# Patient Record
Sex: FEMALE | Race: WHITE | NOT HISPANIC OR LATINO | Employment: FULL TIME | URBAN - METROPOLITAN AREA
[De-identification: names, ages, dates, MRNs, and addresses within clinical notes are randomized per-mention and may not be internally consistent; named-entity substitution may affect disease eponyms.]

---

## 2017-01-02 ENCOUNTER — OFFICE VISIT (OUTPATIENT)
Dept: FAMILY MEDICINE | Facility: CLINIC | Age: 22
End: 2017-01-02
Payer: COMMERCIAL

## 2017-01-02 VITALS
SYSTOLIC BLOOD PRESSURE: 128 MMHG | HEIGHT: 72 IN | BODY MASS INDEX: 39.68 KG/M2 | WEIGHT: 293 LBS | HEART RATE: 130 BPM | OXYGEN SATURATION: 97 % | TEMPERATURE: 97 F | DIASTOLIC BLOOD PRESSURE: 78 MMHG | RESPIRATION RATE: 18 BRPM

## 2017-01-02 DIAGNOSIS — Z01.419 ENCOUNTER FOR GYNECOLOGICAL EXAMINATION WITHOUT ABNORMAL FINDING: ICD-10-CM

## 2017-01-02 DIAGNOSIS — Z30.432 ENCOUNTER FOR IUD REMOVAL: Primary | ICD-10-CM

## 2017-01-02 PROBLEM — E66.01 MORBID OBESITY DUE TO EXCESS CALORIES (H): Status: ACTIVE | Noted: 2017-01-02

## 2017-01-02 PROCEDURE — 58301 REMOVE INTRAUTERINE DEVICE: CPT | Performed by: FAMILY MEDICINE

## 2017-01-02 PROCEDURE — 87491 CHLMYD TRACH DNA AMP PROBE: CPT | Mod: 90 | Performed by: FAMILY MEDICINE

## 2017-01-02 PROCEDURE — 99203 OFFICE O/P NEW LOW 30 MIN: CPT | Mod: 25 | Performed by: FAMILY MEDICINE

## 2017-01-02 PROCEDURE — 87591 N.GONORRHOEAE DNA AMP PROB: CPT | Mod: 90 | Performed by: FAMILY MEDICINE

## 2017-01-02 PROCEDURE — 99000 SPECIMEN HANDLING OFFICE-LAB: CPT | Performed by: FAMILY MEDICINE

## 2017-01-02 PROCEDURE — G0145 SCR C/V CYTO,THINLAYER,RESCR: HCPCS | Mod: 90 | Performed by: FAMILY MEDICINE

## 2017-01-02 ASSESSMENT — PAIN SCALES - GENERAL: PAINLEVEL: NO PAIN (0)

## 2017-01-02 NOTE — PROGRESS NOTES
SUBJECTIVE:                                                    Linette Rosa is a 21 year old female who presents to clinic today for the following health issues:      Concern - IUD Removal   Linette is here today for Mirena removal. She is new to our clinic. She had it placed in couple years ago and it has been working well. She was using it mainly for both control management. She and her significant other are trying to conceive and therefore she would like to have it removed today. She has never been pregnant before. She is otherwise healthy. She was smoking a half a pack per day but is quitting. No alcohol. Her last Pap smear was years ago and was normal; no history of abnormal Pap smear. Denies of STI risks and no history of STI infection. No other concern.    Problem list and histories reviewed & adjusted, as indicated.  Additional history: as documented    There is no problem list on file for this patient.    Past Surgical History   Procedure Laterality Date     Wrist surgery         Social History   Substance Use Topics     Smoking status: Current Every Day Smoker -- 0.50 packs/day for 9 years     Types: Cigarettes     Smokeless tobacco: Never Used      Comment: started age 12, officially  when she was 16     Alcohol Use: 0.0 oz/week     0 Standard drinks or equivalent per week      Comment: occasionally     Family History   Problem Relation Age of Onset     DIABETES Mother      Unknown/Adopted Maternal Grandmother      Unknown/Adopted Maternal Grandfather      Unknown/Adopted Paternal Grandmother      Unknown/Adopted Paternal Grandfather          No current outpatient prescriptions on file.     Allergies   Allergen Reactions     [place] Oil [Fish Oil] Swelling       ROS:  Constitutional, HEENT, cardiovascular, pulmonary, gi and gu systems are negative, except as otherwise noted.    OBJECTIVE:                                                    /78 mmHg  Pulse 130  Temp(Src) 97  F (36.1  C) (Temporal)  " Resp 18  Ht 5' 11.75\" (1.822 m)  Wt 339 lb 1.6 oz (153.815 kg)  BMI 46.33 kg/m2  SpO2 97%  Breastfeeding? No  Body mass index is 46.33 kg/(m^2).  GENERAL: healthy, alert and no distress  CV: regular rate and rhythm.  ABDOMEN: soft, nontender and bowel sounds normal   (female): normal female external genitalia, normal urethral meatus, vaginal mucosa, normal cervix/adnexa/uterus without masses or discharge.  Obvious IUD string was noted. Pending for Pap and G/C.  No external lesion was noted.    Diagnostic Test Results:  No results found for this or any previous visit.     ASSESSMENT/PLAN:                                                    1. Encounter for IUD removal    Linette is here today for her Mirena removal. She had a put in about couple years ago through [facility]. It has been working well with no side effect. She and her significant other is planning to conceive.  The Mirena was removed without problem today and please see my procedure note for further details.  Informed her that once the IUd is removed, she had no birth control backup and therefore she could get pregnant at any time. She stated she would be very happy to get pregnant right away. Recommend to start prenatal vitamin daily.    - REMOVE INTRAUTERINE DEVICE    2. Encounter for routine gynecological examination  She believes that she needed a Pap smear. She can't recall when her last Pap smear was. No history of abnormal Pap smear. Denies any risks for STI and had no history of it. Pap smear obtained. Also pending for GC. STD prevention discussed..    - Pap imaged thin layer screen only - recommended age 21 - 24 years  - NEISSERIA GONORRHOEA PCR  - CHLAMYDIA TRACHOMATIS PCR    Mart Aranda Mai, MD  [facility]    "

## 2017-01-02 NOTE — Clinical Note
[address] [address]  [phone]      January 4, 2017    Linette Rosa  [address] [facility] [address]          Dear Linette,    I am happy to inform you that your recent cervical cancer screening test (PAP smear) was normal.      Preventative screening such as this helps insure your health for years to come.  This test should be repeated in 3 years unless otherwise directed.    You will still need to return to the clinic every year for your annual exam and other preventive tests.    Please contact the clinic if you have further questions.      Sincerely,    Mart Aranda Mai, MD/jose

## 2017-01-03 LAB
C TRACH DNA SPEC QL NAA+PROBE: NORMAL
N GONORRHOEA DNA SPEC QL NAA+PROBE: NORMAL
SPECIMEN SOURCE: NORMAL
SPECIMEN SOURCE: NORMAL

## 2017-01-04 LAB
COPATH REPORT: NORMAL
PAP: NORMAL

## 2017-01-05 ENCOUNTER — TELEPHONE (OUTPATIENT)
Dept: FAMILY MEDICINE | Facility: CLINIC | Age: 22
End: 2017-01-05

## 2017-01-05 NOTE — TELEPHONE ENCOUNTER
Notes Recorded by Mart Bronson MD on 1/4/2017 at 7:45 AM  Please let patient know that both of her chlamydia and gonorrhea test was negative as expected. Thank you

## 2017-01-05 NOTE — Clinical Note
[address] [address]  Phone: [phone]  Fax: [phone]          January 5, 2017    Linette Rosa  [address] [address] [place] [address]          Dear Linette,      LAB RESULTS:     The results of your recent GC Chlamydia were NORMAL.  If you have any further questions or problems, please contact our office.          Sincerely,      Mart Bronson M.D.

## 2017-01-05 NOTE — TELEPHONE ENCOUNTER
Tried to reach patient, left message for patient to call the clinic back.  Normal letter sent.  Angelia Porter CMA

## 2017-06-12 ENCOUNTER — HOSPITAL ENCOUNTER (EMERGENCY)
Facility: CLINIC | Age: 22
Discharge: HOME | End: 2017-06-12
Attending: FAMILY MEDICINE | Admitting: FAMILY MEDICINE
Payer: COMMERCIAL

## 2017-06-12 VITALS
RESPIRATION RATE: 16 BRPM | DIASTOLIC BLOOD PRESSURE: 100 MMHG | TEMPERATURE: 98.3 F | HEART RATE: 100 BPM | WEIGHT: 293 LBS | BODY MASS INDEX: 39.68 KG/M2 | OXYGEN SATURATION: 97 % | HEIGHT: 72 IN | SYSTOLIC BLOOD PRESSURE: 143 MMHG

## 2017-06-12 DIAGNOSIS — H66.002 LEFT ACUTE SUPPURATIVE OTITIS MEDIA: ICD-10-CM

## 2017-06-12 PROCEDURE — 99284 EMERGENCY DEPT VISIT MOD MDM: CPT | Mod: Z6 | Performed by: FAMILY MEDICINE

## 2017-06-12 PROCEDURE — 99282 EMERGENCY DEPT VISIT SF MDM: CPT | Performed by: FAMILY MEDICINE

## 2017-06-12 RX ORDER — TETRACAINE HYDROCHLORIDE 5 MG/ML
3 SOLUTION OPHTHALMIC EVERY 4 HOURS PRN
Qty: 1 ML | Refills: 0 | Status: SHIPPED | OUTPATIENT
Start: 2017-06-12 | End: 2017-11-03

## 2017-06-12 RX ORDER — TETRACAINE HYDROCHLORIDE 5 MG/ML
3 SOLUTION OPHTHALMIC EVERY 4 HOURS PRN
Qty: 1 ML | Refills: 0 | Status: SHIPPED | OUTPATIENT
Start: 2017-06-12 | End: 2017-06-12

## 2017-06-12 RX ORDER — AMOXICILLIN 500 MG/1
500 CAPSULE ORAL 3 TIMES DAILY
Qty: 30 CAPSULE | Refills: 0 | Status: SHIPPED | OUTPATIENT
Start: 2017-06-12 | End: 2017-06-22

## 2017-06-12 NOTE — ED AVS SNAPSHOT
[facility]    [address] [place] [address]    Phone:  [phone]    Fax:  [phone]                                       Linette Rosa   MRN: [id number]    Department:  [facility]   Date of Visit:  6/12/2017           After Visit Summary Signature Page     I have received my discharge instructions, and my questions have been answered. I have discussed any challenges I see with this plan with the nurse or doctor.    ..........................................................................................................................................  Patient/Patient Representative Signature      ..........................................................................................................................................  Patient Representative Print Name and Relationship to Patient    ..................................................               ................................................  Date                                            Time    ..........................................................................................................................................  Reviewed by Signature/Title    ...................................................              ..............................................  Date                                                            Time

## 2017-06-12 NOTE — ED AVS SNAPSHOT
[facility] Department    [address] [place] [address]    Phone:  [phone]    Fax:  [phone]                                       Linette Rosa   MRN: [id number]    Department:  [facility] Emergency Department   Date of Visit:  6/12/2017           Patient Information     Date Of Birth          1995        Your diagnoses for this visit were:     Left acute suppurative otitis media        You were seen by Manjit Reinoso MD.      Follow-up Information     Follow up with [facility] Emergency Department.    Specialty:  EMERGENCY MEDICINE    Contact information:    [facility]   [place] [id number] [phone]    Additional information:    From [address]: Exit at [address] on south side of [place]. Turn right on [address]. Turn left at stoplight on [address]. [facility] will be in view two blocks ahead        Follow up with your primary care provider In 3 days.    Why:  if not improving        Discharge Instructions       Begin amoxicillin 500 mg 3 times a day for 10 days.    Use  tetracaine drops in left ear as needed for pain        Otitis Media (Middle-Ear Infection) in Adults  Otitis media is another name for a middle-ear infection. It means an infection behind your eardrum. This kind of ear infection can happen after any condition that keeps fluid from draining from the middle ear. These conditions include allergies, a cold, a sore throat, or a respiratory infection.  Middle-ear infections are common in children, but they can also happen in adults. An ear infection in an adult may mean a more serious problem than in a child. So you may need additional tests. If you have an ear infection, you should see your health care provider for treatment.  What are the types of middle-ear infections?  Infections can affect the middle ear in several ways. They are:    Acute otitis media. This middle-ear infection occurs  suddenly. It causes swelling and redness. Fluid and mucus become trapped inside the ear. You can have a fever and ear pain.    Otitis media with effusion. Fluid (effusion) and mucus build up in the middle ear after the infection goes away. You may feel like your middle ear is full. This can continue for months and may affect your hearing.    Chronic otitis media with effusion. Fluid (effusion) remains in the middle ear for a long time. Or it builds up again and again, even though there is no infection. This type of middle-ear infection may be hard to treat. It may also affect your hearing.  Who is more likely to get a middle-ear infection?  You are more likely to get an ear infection if you:    Smoke or are around someone who smokes    Have seasonal or year-round allergy symptoms    Have a cold or other upper respiratory infection  What causes a middle-ear infection?  The middle ear connects to the throat by a canal called the eustachian tube. This tube helps even out the pressure between the outer ear and the inner ear. A cold or allergy can irritate the tube or cause the area around it to swell. This can keep fluid from draining from the middle ear. The fluid builds up behind the eardrum. Bacteria and viruses can grow in this fluid. The bacteria and viruses cause the middle-ear infection.  What are the symptoms of a middle-ear infection?  Common symptoms of a middle-ear infection in adults are:    Pain in 1 or both ears    Drainage from the ear    Muffled hearing    Sore throat   You may also have a fever. Rarely, your balance can be affected.  These symptoms may be the same as for other conditions. It s important to talk with your health care provider if you think you have a middle-ear infection. If you have a high fever, severe pain behind your ear, or paralysis in your face, see your provider as soon as you can.  How is a middle-ear infection diagnosed?  Your health care provider will take a medical history and  do a physical exam. He or she will look at the outer ear and eardrum with an otoscope. The otoscope is a lighted tool that lets your provider see inside the ear. A pneumatic otoscope blows a puff of air into the ear to check how well your eardrum moves. If you eardrum doesn t move well, it may mean you have fluid behind it.  Your provider may also do a test called tympanometry. This test tells how well the middle ear is working. It can find any changes in pressure in the middle ear. Your provider may test your hearing with a tuning fork.  How is a middle-ear infection treated?  A middle-ear infection may be treated with:    Antibiotics, taken by mouth or as ear drops    Medication for pain    Decongestants, antihistamines, or nasal steroids  Your health care provider may also have you try autoinsufflation. This helps adjust the air pressure in your ear. For this, you pinch your nose and gently exhale. This forces air back through the eustachian tube.  The exact treatment for your ear infection will depend on the type of infection you have. In general, if your symptoms don t get better in 48 to 72 hours, contact your health care provider.  Middle-ear infections can cause long-term problems if not treated. They can lead to:    Infection in other parts of the head    Permanent hearing loss    Paralysis of a nerve in your face  If you have a middle-ear infection that doesn t get better, you may need to see an ear, nose, and throat specialist (otolaryngologist). You may need a CT scan or MRI to check for head and neck cancer.  Ear tubes  Sometimes fluid stays in the middle ear even after you take antibiotics and the infection goes away. In this case, your health care provider may suggest that a small tube be placed in your ear. The tube is put at the opening of the eardrum. The tube keeps fluid from building up and relieves pressure in the middle ear. It can also help you hear better. This surgery is called myringotomy.  It is not often done in adults.  The tubes usually fall out on their own after 6 months to a year.    7488-5559 [employer]. [address] [place] [address], [place] [address]. All rights reserved. This information is not intended as a substitute for professional medical care. Always follow your healthcare professional's instructions.          24 Hour Appointment Hotline       To make an appointment at any [facility], call [phone] ([phone]). If you don't have a family doctor or clinic, we will help you find one. [facility] clinics are conveniently located to serve the needs of you and your family.             Review of your medicines      START taking        Dose / Directions Last dose taken    amoxicillin 500 MG capsule   Commonly known as:  AMOXIL   Dose:  500 mg   Quantity:  30 capsule        Take 1 capsule (500 mg) by mouth 3 times daily for 10 days   Refills:  0        tetracaine 0.5 % ophthalmic solution   Commonly known as:  PONTOCAINE   Dose:  3 drop   Quantity:  1 mL        Apply 3 drops to eye every 4 hours as needed Instill drops in LEFT EAR INSTEAD OF EYE as needed for ear pain for 2 days   Refills:  0          Our records show that you are taking the medicines listed below. If these are incorrect, please call your family doctor or clinic.        Dose / Directions Last dose taken    IBUPROFEN PO   Dose:  800 mg        Take 800 mg by mouth   Refills:  0        TYLENOL PO   Dose:  1000 mg        Take 1,000 mg by mouth   Refills:  0                Prescriptions were sent or printed at these locations (2 Prescriptions)                   [facility] [place] [id number]   [address], [facility] [address]    Telephone:  [phone]   Fax:  [phone]   Hours:                  E-Prescribed (2 of 2)         amoxicillin (AMOXIL) 500 MG capsule               tetracaine (PONTOCAINE) 0.5 % ophthalmic solution                Orders Needing Specimen Collection  "    None      Pending Results     No orders found from 6/10/2017 to 2017.            Pending Culture Results     No orders found from 6/10/2017 to 2017.            Pending Results Instructions     If you had any lab results that were not finalized at the time of your Discharge, you can call the ED Lab Result RN at [phone]. You will be contacted by this team for any positive Lab results or changes in treatment. The nurses are available 7 days a week from 10A to 6:30P.  You can leave a message 24 hours per day and they will return your call.        Thank you for choosing [facility]       Thank you for choosing [facility] for your care. Our goal is always to provide you with excellent care. Hearing back from our patients is one way we can continue to improve our services. Please take a few minutes to complete the written survey that you may receive in the mail after you visit with us. Thank you!        [employer] Information     [employer] lets you send messages to your doctor, view your test results, renew your prescriptions, schedule appointments and more. To sign up, go to [email or web address] . Click on \"Log in\" on the left side of the screen, which will take you to the Welcome page. Then click on \"Sign up Now\" on the right side of the page.     You will be asked to enter the access code listed below, as well as some personal information. Please follow the directions to create your username and password.     Your access code is: [email or web address]  Expires: 9/10/2017  8:19 PM     Your access code will  in 90 days. If you need help or a new code, please call your [facility] clinic or [phone].        Care EveryWhere ID     This is your Care EveryWhere ID. This could be used by other organizations to access your [facility] medical records  [id number]        After Visit Summary       This is your record. Keep this with you and show to your community pharmacist(s) and doctor(s) at your next visit.             "

## 2017-06-13 NOTE — ED PROVIDER NOTES
[facility] ED Provider Note   CC:     Chief Complaint   Patient presents with     Otalgia     Left ear pain and drainage. Started two days ago.      HPI:  Linette Rosa is a 21 year old female who presented to the emergency department with complaints of a left ear infection. She states that it began approximately three days ago, but the pain began worsening yesterday. At this time, she rates her pain at a 9 out of 10 on the pain scale. She reports that it was draining fluid last night and this morning. The patient endorses having a headache at this time. She denies swimming, cold, fever, cough, rhinorrhea, current medications, and chronic health conditions.     Problem List:  Patient Active Problem List    Diagnosis     Morbid obesity due to excess calories (H)       MEDS:   Discharge Medication List as of 6/12/2017  8:19 PM      CONTINUE these medications which have NOT CHANGED    Details   Acetaminophen (TYLENOL PO) Take 1,000 mg by mouth, Historical      IBUPROFEN PO Take 800 mg by mouth, Historical             ALLERGIES:    Allergies   Allergen Reactions     [place] Oil [Fish Oil] Swelling       Past medical, surgical, family and social histories, triage and nursing notes were all reviewed.    Review of Systems   All other systems were reviewed and are negative    Physical Exam   Vitals were reviewed  Temp: 98.3  F (36.8  C) Temp src: Temporal BP: (!) 143/100 Pulse: 100   Resp: 16 SpO2: 97 %      GENERAL APPEARANCE: Alert, moderate distress due to pain  FACE: normal facies  EYES: Pupils are equal  HENT: normal external exam; left TM is grossly abnormal landmarks with bulging and mild to moderate purulence of the tympanic membrane.  No obvious perforation  NECK: no adenopathy or asymmetry  RESP: normal respiratory effort; clear breath sounds bilaterally  CV: regular rate and rhythm; no significant murmurs, gallops or rubs  ABD: soft, no  tenderness; no rebound or guarding; bowel sounds are normal  EXT: No calf tenderness or pitting edema  SKIN: no worrisome rash          Available Lab/Imaging Results   No results found for this or any previous visit (from the past 24 hour(s)).      Impression     Final diagnoses:   Left acute suppurative otitis media       ED Course & Medical Decision Making   Linette Rosa is a 21 year old female who presented to the emergency department with 3 day history of left ear pain, with some drainage from the left ear and severe pain today of 9/10.  Patient denies any swimming exposure, and does not have any current URI symptoms.  She does not have fever.  Hearing is muffled.  Exam reveals a suppurative left otitis media.  Patient has not been on antibiotics in the last 3 months.  Begin amoxicillin 500 mg 3 times a day for 10 days.  Use tetracaine numbing drops as needed for pain.  Recheck in the clinic in 3 days if not improving.  If she has significant drainage, I recommended a recheck in the clinic in 10 days check for the possibility of a ruptured TM.  Return to the ED at any time if symptoms worsen.      Written after-visit summary and instructions were given at the time of discharge.      Discharge Medication List as of 6/12/2017  8:19 PM      START taking these medications    Details   amoxicillin (AMOXIL) 500 MG capsule Take 1 capsule (500 mg) by mouth 3 times daily for 10 days, Disp-30 capsule, R-0, E-Prescribe      tetracaine (PONTOCAINE) 0.5 % ophthalmic solution Apply 3 drops to eye every 4 hours as needed Instill drops in LEFT EAR INSTEAD OF EYE as needed for ear pain for 2 days, Disp-1 mL, R-0, E-Prescribe           This document serves as a record of services personally performed by Manjit Reinoso MD. It was created on their behalf by Helen Alvarenga, a trained medical scribe. The creation of this record is based on the provider's personal observations and the statements of the patient. This document has  been checked and approved by the attending provider.    This note was completed in part using Dragon voice recognition, and may contain word and grammatical errors.        Manjit Reinoso MD  06/12/17 9039

## 2017-06-13 NOTE — DISCHARGE INSTRUCTIONS
Begin amoxicillin 500 mg 3 times a day for 10 days.    Use  tetracaine drops in left ear as needed for pain        Otitis Media (Middle-Ear Infection) in Adults  Otitis media is another name for a middle-ear infection. It means an infection behind your eardrum. This kind of ear infection can happen after any condition that keeps fluid from draining from the middle ear. These conditions include allergies, a cold, a sore throat, or a respiratory infection.  Middle-ear infections are common in children, but they can also happen in adults. An ear infection in an adult may mean a more serious problem than in a child. So you may need additional tests. If you have an ear infection, you should see your health care provider for treatment.  What are the types of middle-ear infections?  Infections can affect the middle ear in several ways. They are:    Acute otitis media. This middle-ear infection occurs suddenly. It causes swelling and redness. Fluid and mucus become trapped inside the ear. You can have a fever and ear pain.    Otitis media with effusion. Fluid (effusion) and mucus build up in the middle ear after the infection goes away. You may feel like your middle ear is full. This can continue for months and may affect your hearing.    Chronic otitis media with effusion. Fluid (effusion) remains in the middle ear for a long time. Or it builds up again and again, even though there is no infection. This type of middle-ear infection may be hard to treat. It may also affect your hearing.  Who is more likely to get a middle-ear infection?  You are more likely to get an ear infection if you:    Smoke or are around someone who smokes    Have seasonal or year-round allergy symptoms    Have a cold or other upper respiratory infection  What causes a middle-ear infection?  The middle ear connects to the throat by a canal called the eustachian tube. This tube helps even out the pressure between the outer ear and the inner ear. A  cold or allergy can irritate the tube or cause the area around it to swell. This can keep fluid from draining from the middle ear. The fluid builds up behind the eardrum. Bacteria and viruses can grow in this fluid. The bacteria and viruses cause the middle-ear infection.  What are the symptoms of a middle-ear infection?  Common symptoms of a middle-ear infection in adults are:    Pain in 1 or both ears    Drainage from the ear    Muffled hearing    Sore throat   You may also have a fever. Rarely, your balance can be affected.  These symptoms may be the same as for other conditions. It s important to talk with your health care provider if you think you have a middle-ear infection. If you have a high fever, severe pain behind your ear, or paralysis in your face, see your provider as soon as you can.  How is a middle-ear infection diagnosed?  Your health care provider will take a medical history and do a physical exam. He or she will look at the outer ear and eardrum with an otoscope. The otoscope is a lighted tool that lets your provider see inside the ear. A pneumatic otoscope blows a puff of air into the ear to check how well your eardrum moves. If you eardrum doesn t move well, it may mean you have fluid behind it.  Your provider may also do a test called tympanometry. This test tells how well the middle ear is working. It can find any changes in pressure in the middle ear. Your provider may test your hearing with a tuning fork.  How is a middle-ear infection treated?  A middle-ear infection may be treated with:    Antibiotics, taken by mouth or as ear drops    Medication for pain    Decongestants, antihistamines, or nasal steroids  Your health care provider may also have you try autoinsufflation. This helps adjust the air pressure in your ear. For this, you pinch your nose and gently exhale. This forces air back through the eustachian tube.  The exact treatment for your ear infection will depend on the type of  infection you have. In general, if your symptoms don t get better in 48 to 72 hours, contact your health care provider.  Middle-ear infections can cause long-term problems if not treated. They can lead to:    Infection in other parts of the head    Permanent hearing loss    Paralysis of a nerve in your face  If you have a middle-ear infection that doesn t get better, you may need to see an ear, nose, and throat specialist (otolaryngologist). You may need a CT scan or MRI to check for head and neck cancer.  Ear tubes  Sometimes fluid stays in the middle ear even after you take antibiotics and the infection goes away. In this case, your health care provider may suggest that a small tube be placed in your ear. The tube is put at the opening of the eardrum. The tube keeps fluid from building up and relieves pressure in the middle ear. It can also help you hear better. This surgery is called myringotomy. It is not often done in adults.  The tubes usually fall out on their own after 6 months to a year.    0362-6466 [employer]. [address] [place] [address], [place] [address]. All rights reserved. This information is not intended as a substitute for professional medical care. Always follow your healthcare professional's instructions.

## 2017-08-31 ENCOUNTER — ALLIED HEALTH/NURSE VISIT (OUTPATIENT)
Dept: FAMILY MEDICINE | Facility: OTHER | Age: 22
End: 2017-08-31
Payer: COMMERCIAL

## 2017-08-31 VITALS — WEIGHT: 293 LBS | BODY MASS INDEX: 44.83 KG/M2

## 2017-08-31 DIAGNOSIS — Z32.00 POSSIBLE PREGNANCY, NOT YET CONFIRMED: Primary | ICD-10-CM

## 2017-08-31 LAB — BETA HCG QUAL IFA URINE: NEGATIVE

## 2017-08-31 PROCEDURE — 84703 CHORIONIC GONADOTROPIN ASSAY: CPT | Performed by: ADVANCED PRACTICE MIDWIFE

## 2017-08-31 NOTE — MR AVS SNAPSHOT
"              After Visit Summary   2017    Linette Rosa    MRN: [id number]           Patient Information     Date Of Birth          1995        Visit Information        Provider Department      2017 11:00 AM NL RN TEAM A, [facility]        Today's Diagnoses     Possible pregnancy, not yet confirmed    -  1       Follow-ups after your visit        Who to contact     If you have questions or need follow up information about today's clinic visit or your schedule please contact [facility] directly at [phone].  Normal or non-critical lab and imaging results will be communicated to you by [employer], letter or phone within 4 business days after the clinic has received the results. If you do not hear from us within 7 days, please contact the clinic through [employer] or phone. If you have a critical or abnormal lab result, we will notify you by phone as soon as possible.  Submit refill requests through [employer] or call your pharmacy and they will forward the refill request to us. Please allow 3 business days for your refill to be completed.          Additional Information About Your Visit        MyChart Information     [employer] lets you send messages to your doctor, view your test results, renew your prescriptions, schedule appointments and more. To sign up, go to [email or web address] . Click on \"Log in\" on the left side of the screen, which will take you to the Welcome page. Then click on \"Sign up Now\" on the right side of the page.     You will be asked to enter the access code listed below, as well as some personal information. Please follow the directions to create your username and password.     Your access code is: [email or web address]  Expires: 9/10/2017  8:19 PM     Your access code will  in 90 days. If you need help or a new code, please call your [facility] or [phone].        Care EveryWhere ID     This is your Care EveryWhere ID. This could be used by " other organizations to access your [facility] medical records  [id number]        Your Vitals Were     BMI (Body Mass Index)                   44.83 kg/m2            Blood Pressure from Last 3 Encounters:   06/12/17 (!) 143/100   01/02/17 128/78    Weight from Last 3 Encounters:   08/31/17 (!) 326 lb (147.9 kg)   06/12/17 (!) 340 lb 4 oz (154.3 kg)   01/02/17 (!) 339 lb 1.6 oz (153.8 kg)              We Performed the Following     Beta HCG qual IFA urine        Primary Care Provider    None       No address on file        Equal Access to Services     [facility]: Hadii tonja joyner Somarilyn, waaxda vikyqadaha, arcelia kaalmamarie reyes, chandra wiggins . So [place] [phone].    ATENCIÓN: Si habla español, tiene a ocsar disposición servicios gratuitos de asistencia lingüística. Llame al [phone].    We comply with applicable federal civil rights laws and [place] laws. We do not discriminate on the basis of race, color, national origin, age, disability sex, sexual orientation or gender identity.            Thank you!     Thank you for choosing [facility]  for your care. Our goal is always to provide you with excellent care. Hearing back from our patients is one way we can continue to improve our services. Please take a few minutes to complete the written survey that you may receive in the mail after your visit with us. Thank you!             Your Updated Medication List - Protect others around you: Learn how to safely use, store and throw away your medicines at www.disposemymeds.org.          This list is accurate as of: 8/31/17 11:13 AM.  Always use your most recent med list.                   Brand Name Dispense Instructions for use Diagnosis    IBUPROFEN PO      Take 800 mg by mouth        tetracaine 0.5 % ophthalmic solution    PONTOCAINE    1 mL    Apply 3 drops to eye every 4 hours as needed Instill drops in LEFT EAR INSTEAD OF EYE as needed for ear pain for 2 days         TYLENOL PO      Take 1,000 mg by mouth

## 2017-08-31 NOTE — NURSING NOTE
Linette Rosa is a 21 year old here today for a pregnancy test.  LMP: 07/18/2017  Wt: 326 lb.    Symptoms include: absence of menses    Pregnancy test ordered per standing order.    Patient informed of negative pregnancy test.     Plan:  Patient to call back if symptoms do not improve, symptoms worsen, or with further questions or concerns.  Advised pt to give it another week, if she does not get her period by next week to come back in and take another pregnancy test.  If this reads negative and pt still has not got her period, we will schedule her to see a provider.    Pt verbalized understanding and agrees to plan.    Kacey Burgess RN

## 2017-11-03 ENCOUNTER — HOSPITAL ENCOUNTER (EMERGENCY)
Facility: CLINIC | Age: 22
Discharge: HOME | End: 2017-11-03
Attending: EMERGENCY MEDICINE | Admitting: EMERGENCY MEDICINE
Payer: OTHER MISCELLANEOUS

## 2017-11-03 VITALS
DIASTOLIC BLOOD PRESSURE: 96 MMHG | OXYGEN SATURATION: 98 % | HEART RATE: 100 BPM | TEMPERATURE: 98.1 F | RESPIRATION RATE: 18 BRPM | SYSTOLIC BLOOD PRESSURE: 184 MMHG

## 2017-11-03 DIAGNOSIS — M70.21 OLECRANON BURSITIS, RIGHT ELBOW: ICD-10-CM

## 2017-11-03 PROCEDURE — 99283 EMERGENCY DEPT VISIT LOW MDM: CPT | Mod: Z6 | Performed by: EMERGENCY MEDICINE

## 2017-11-03 PROCEDURE — 99282 EMERGENCY DEPT VISIT SF MDM: CPT | Performed by: EMERGENCY MEDICINE

## 2017-11-03 ASSESSMENT — ENCOUNTER SYMPTOMS
WEAKNESS: 0
ARTHRALGIAS: 1
NUMBNESS: 0
JOINT SWELLING: 1

## 2017-11-03 NOTE — ED PROVIDER NOTES
History     Chief Complaint   Patient presents with     Elbow Pain     The history is provided by the patient.     Linette Rosa is a 21 year old female who presents ED for evaluation of acute elbow pain, swelling, and decreased range of motion.  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  Since then, the joint is become more stiff and she reports reduced range of motion.  She denies any trauma.    Problem List:    Patient Active Problem List    Diagnosis Date Noted     Morbid obesity due to excess calories (H) 01/02/2017     Priority: Medium        Past Medical History:    History reviewed. No pertinent past medical history.    Past Surgical History:    Past Surgical History:   Procedure Laterality Date     WRIST SURGERY         Family History:    Family History   Problem Relation Age of Onset     DIABETES Mother      Unknown/Adopted Maternal Grandmother      Unknown/Adopted Maternal Grandfather      Unknown/Adopted Paternal Grandmother      Unknown/Adopted Paternal Grandfather        Social History:  Marital Status:  Single [1]  Social History   Substance Use Topics     Smoking status: Current Every Day Smoker     Packs/day: 0.50     Years: 9.00     Types: Cigarettes     Smokeless tobacco: Never Used      Comment: started age 12, officially  when she was 16     Alcohol use 0.0 oz/week     0 Standard drinks or equivalent per week      Comment: occasionally        Medications:      Acetaminophen (TYLENOL PO)   IBUPROFEN PO         Review of Systems   Musculoskeletal: Positive for arthralgias and joint swelling.   Neurological: Negative for weakness and numbness.   All other systems reviewed and are negative.      Physical Exam   BP: (!) 184/96  Pulse: 100  Temp: 98.1  F (36.7  C)  Resp: 18  SpO2: 98 %      Physical Exam   Constitutional: She is oriented to person, place, and time. She appears well-developed. No distress.    Musculoskeletal:        Right elbow: She exhibits decreased range of motion and swelling. Tenderness found. Olecranon process tenderness noted.   Neurological: She is alert and oriented to person, place, and time. She has normal strength. No sensory deficit.   Nursing note and vitals reviewed.      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  She comes in today because of right elbow pain and swelling that started this evening.  Patient states that she was leaning on her elbow on a desk when it suddenly became painful and since then has become more swollen resulting in decreased range of motion.  She does a considerable amount of lifting at the nursing home but denies any trauma.  On examination, she has significant swelling and tenderness over the olecranon bursa with relatively normal range of motion.  She is neurologically intact and has good distal pulses.  Essentially this appears to be an acute olecranon bursitis.  I discussed management of this including ice, ibuprofen or Aleve for pain and swelling, and avoidance of pressure on the bursa during the acute phase.  I've given her a work note taking her off work for the next 2 days and reviewed with her indications to return to the ED should the need arise.  All questions for the patient were answered and she was suitable for discharge in satisfactory condition.       I have reviewed the nursing notes.    I have reviewed the findings, diagnosis, plan and need for follow up with the patient.       New Prescriptions    No medications on file       Final diagnoses:   Olecranon bursitis, right elbow       11/3/2017   [facility] EMERGENCY DEPARTMENT     Kennedy Galeano MD  11/03/17 0115

## 2017-11-03 NOTE — ED NOTES
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  She noted achy pain to her right elbow while at work today and thru out her shift her elbow has got more and more painful and w/decreased ROM.  Denies any overuse/injury/illness.

## 2017-11-03 NOTE — LETTER
To Whom it may concern:      Linette Rosa was seen in our Emergency Department today, 11/03/17.  I expect her condition to improve over the next 1-2 days.  She may return to work/school when improved.    Sincerely,        Kennedy Galeano MD

## 2017-11-03 NOTE — ED AVS SNAPSHOT
[facility] Emergency Department    [address] [place] [address]    Phone:  [phone]    Fax:  [phone]                                       Linette Rosa   MRN: [id number]    Department:  [facility] Emergency Department   Date of Visit:  11/3/2017           After Visit Summary Signature Page     I have received my discharge instructions, and my questions have been answered. I have discussed any challenges I see with this plan with the nurse or doctor.    ..........................................................................................................................................  Patient/Patient Representative Signature      ..........................................................................................................................................  Patient Representative Print Name and Relationship to Patient    ..................................................               ................................................  Date                                            Time    ..........................................................................................................................................  Reviewed by Signature/Title    ...................................................              ..............................................  Date                                                            Time

## 2017-11-03 NOTE — ED AVS SNAPSHOT
[facility] Emergency Department    [address] [place] [address] [address] [place] [address]    Phone:  [phone]    Fax:  [phone]                                       Linette Rosa   MRN: [id number]    Department:  [facility] Emergency Department   Date of Visit:  11/3/2017           Patient Information     Date Of Birth          1995        Your diagnoses for this visit were:     Olecranon bursitis, right elbow        You were seen by Kennedy Galeano MD.      Follow-up Information     Follow up with Clinic, [facility].    Why:  As needed    Contact information:    [address] [place] [address] [place] [id number] [phone]        Discharge References/Attachments     BURSITIS OF THE ELBOW (OLECRANON) (ENGLISH)      24 Hour Appointment Hotline       To make an appointment at any [place] clinic, call [phone] ([phone]). If you don't have a family doctor or clinic, we will help you find one. [place] clinics are conveniently located to serve the needs of you and your family.             Review of your medicines      Our records show that you are taking the medicines listed below. If these are incorrect, please call your family doctor or clinic.        Dose / Directions Last dose taken    IBUPROFEN PO   Dose:  800 mg        Take 800 mg by mouth   Refills:  0        TYLENOL PO   Dose:  1000 mg        Take 1,000 mg by mouth   Refills:  0                Orders Needing Specimen Collection     None      Pending Results     No orders found from 11/1/2017 to 11/4/2017.            Pending Culture Results     No orders found from 11/1/2017 to 11/4/2017.            Pending Results Instructions     If you had any lab results that were not finalized at the time of your Discharge, you can call the ED Lab Result RN at [phone]. You will be contacted by this team for any positive Lab results or changes in treatment. The nurses are available 7 days a week from 10A to 6:30P.  You can  "leave a message 24 hours per day and they will return your call.        Thank you for choosing [facility]       Thank you for choosing [facility] for your care. Our goal is always to provide you with excellent care. Hearing back from our patients is one way we can continue to improve our services. Please take a few minutes to complete the written survey that you may receive in the mail after you visit with us. Thank you!        [employer] Information     [employer] lets you send messages to your doctor, view your test results, renew your prescriptions, schedule appointments and more. To sign up, go to [email or web address] . Click on \"Log in\" on the left side of the screen, which will take you to the Welcome page. Then click on \"Sign up Now\" on the right side of the page.     You will be asked to enter the access code listed below, as well as some personal information. Please follow the directions to create your username and password.     Your access code is: [email or web address]  Expires: 2018  1:12 AM     Your access code will  in 90 days. If you need help or a new code, please call your [facility] clinic or [phone].        Care EveryWhere ID     This is your Care EveryWhere ID. This could be used by other organizations to access your [facility] medical records  [id number]        Equal Access to Services     [facility] : Kerline Vo, waaxda luqadaha, qaybta kaalmada amy, chandra wiggins . So [facility] [phone].    ATENCIÓN: Si habla español, tiene a oscar disposición servicios gratuitos de asistencia lingüística. Llame al [phone].    We comply with applicable federal civil rights laws and [place] laws. We do not discriminate on the basis of race, color, national origin, age, disability, sex, sexual orientation, or gender identity.            After Visit Summary       This is your record. Keep this with you and show to your community pharmacist(s) and doctor(s) at " your next visit.

## 2018-01-09 ENCOUNTER — HOSPITAL ENCOUNTER (EMERGENCY)
Facility: CLINIC | Age: 23
Discharge: HOME | End: 2018-01-09
Attending: FAMILY MEDICINE | Admitting: FAMILY MEDICINE
Payer: OTHER MISCELLANEOUS

## 2018-01-09 VITALS
DIASTOLIC BLOOD PRESSURE: 83 MMHG | WEIGHT: 290 LBS | SYSTOLIC BLOOD PRESSURE: 155 MMHG | BODY MASS INDEX: 39.88 KG/M2 | RESPIRATION RATE: 20 BRPM | HEART RATE: 97 BPM | TEMPERATURE: 98.6 F | OXYGEN SATURATION: 100 %

## 2018-01-09 DIAGNOSIS — M54.50 ACUTE BILATERAL LOW BACK PAIN WITHOUT SCIATICA: ICD-10-CM

## 2018-01-09 PROCEDURE — 99283 EMERGENCY DEPT VISIT LOW MDM: CPT | Mod: Z6 | Performed by: FAMILY MEDICINE

## 2018-01-09 PROCEDURE — 99283 EMERGENCY DEPT VISIT LOW MDM: CPT | Performed by: FAMILY MEDICINE

## 2018-01-09 RX ORDER — NAPROXEN 500 MG/1
500 TABLET ORAL 2 TIMES DAILY WITH MEALS
Qty: 16 TABLET | Refills: 0 | Status: SHIPPED | OUTPATIENT
Start: 2018-01-09 | End: 2018-01-16

## 2018-01-09 NOTE — LETTER
[facility] EMERGENCY DEPARTMENT  [address] [place] [address]  Phone: [phone]  Fax: [phone]    January 9, 2018        Linette Rosa  [address] [address]          To whom it may concern:    RE: Linette Rosa    The patient was seen in the emergency department today.  Patient may return to work tomorrow with the following:  Light duty-unable to lift more than 10 pounds for 1 week.  She can return to full duty in 1 week.  If she continues having pain and cannot work full duty she needs reevaluation.          Sincerely,      Jerman Kapoor MD

## 2018-01-09 NOTE — ED PROVIDER NOTES
History     Chief Complaint   Patient presents with     Back Pain     HPI  Linette Rosa is a 22 year old female who presents to the emergency room 1 day after a reported injury at work yesterday.  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  There was a sudden pull on her lower back.  She had pain yesterday.  The pain and stiffness is much worse today.  She has some radiation into her right leg at times.    Problem List:    Patient Active Problem List    Diagnosis Date Noted     Morbid obesity due to excess calories (H) 01/02/2017     Priority: Medium        Past Medical History:    History reviewed. No pertinent past medical history.    Past Surgical History:    Past Surgical History:   Procedure Laterality Date     WRIST SURGERY         Family History:    Family History   Problem Relation Age of Onset     DIABETES Mother      Unknown/Adopted Maternal Grandmother      Unknown/Adopted Maternal Grandfather      Unknown/Adopted Paternal Grandmother      Unknown/Adopted Paternal Grandfather        Social History:  Marital Status:  Single [1]  Social History   Substance Use Topics     Smoking status: Current Every Day Smoker     Packs/day: 0.50     Years: 9.00     Types: Cigarettes     Smokeless tobacco: Never Used      Comment: started age 12, officially  when she was 16     Alcohol use 0.0 oz/week     0 Standard drinks or equivalent per week      Comment: occasionally        Medications:      naproxen (NAPROSYN) 500 MG tablet   Acetaminophen (TYLENOL PO)   IBUPROFEN PO         Review of Systems    Physical Exam   BP: 155/83  Pulse: 97  Temp: 98.6  F (37  C)  Resp: 20  Weight: 131.5 kg (290 lb)  SpO2: 100 %      Physical Exam   Constitutional: She is oriented to person, place, and time. She appears well-developed and well-nourished. No distress.   HENT:   Head: Normocephalic and atraumatic.   Eyes: Conjunctivae are normal.   Neck: Normal range of motion.   Cardiovascular:  Normal rate.    Pulmonary/Chest: Effort normal.   Musculoskeletal:        Lumbar back: She exhibits tenderness, pain and spasm. She exhibits no bony tenderness, no swelling, no edema and no deformity.        Back:    Patient is having pain and spasms in her lower lumbar musculature.  There is a little tenderness to the muscles on palpation.  There is no tenderness over the bony spinous processes or iliac crests.  There is no rash to suggest shingles.  Range of motion restricted secondary to pain.  Distal strength and sensation intact.  No bowel or bladder dysfunction reported.   Neurological: She is alert and oriented to person, place, and time.   Skin: Skin is warm and dry.   Psychiatric: She has a normal mood and affect. Her behavior is normal.   Nursing note and vitals reviewed.      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  There was no fall.  There was no blunt force injury on her back.  Patient is having musculoskeletal pain.  She is having no radicular symptoms of numbness tingling weakness or radiation of the pain in a radicular fashion.  She is tender to the lower lumbar musculature.  I recommended ice alternating with heat.  Naprosyn 500 twice daily 8 days maximum.  No work today and work restrictions for the next week of no heavy lifting greater than 10 pounds.  Patient is comfortable with this evaluation discharge and follow-up plan she is discharged in good condition.  I have reviewed the nursing notes.    I have reviewed the findings, diagnosis, plan and need for follow up with the patient.            New Prescriptions    NAPROXEN (NAPROSYN) 500 MG  TABLET    Take 1 tablet (500 mg) by mouth 2 times daily (with meals) for 8 days       Final diagnoses:   Acute bilateral low back pain without sciatica       1/9/2018   [facility] EMERGENCY DEPARTMENT     Emelia, Jerman HUTCHISON MD  01/09/18 1200

## 2018-01-09 NOTE — DISCHARGE INSTRUCTIONS
General Neck and Back Pain    Both neck and back pain are usually caused by injury to the muscles or ligaments of the spine. Sometimes the disks that separate each bone of the spine may cause pain by pressing on a nearby nerve. Back and neck pain may appear after a sudden twisting or bending force (such as in a car accident), or sometimes after a simple awkward movement. In either case, muscle spasm is often present and adds to the pain.  Acute neck and back pain usually gets better in 1 to 2 weeks. Pain related to disk disease, arthritis in the spinal joints or spinal stenosis (narrowing of the spinal canal) can become chronic and last for months or years.  Back and neck pain are common problems. Most people feel better in 1 or 2 weeks, and most of the rest in 1 to 2 months. Most people can remain active.  People experience and describe pain differently.    Pain can be sharp, stabbing, shooting, aching, cramping, or burning    Movement, standing, bending, lifting, sitting, or walking may worsen the pain    Pain can be localized to one spot or area, or it can be more generalized    Pain can spread or radiate upwards, downwards, to the front, or go down your arms    Muscle spasm may occur.  Most of the time mechanical problems with the muscles or spine cause the pain. it is usually caused by an injury, whether known or not, to the muscles or ligaments. While illnesses can cause back pain, it is usually not caused by a serious illness. Pain is usually related to physical activity, whether sports, exercise, work, or normal activity. Sometimes it can occur without an identifiable cause. This can happen simply by stretching or moving wrong, without noting pain at the time. Other causes include:    Overexertion, lifting, pushing, pulling incorrectly or too aggressively.    Sudden twisting, bending or stretching from an accident (car or fall), or accidental movement.    Poor posture    Poor conditioning, lack of regular  exercise    Spinal disc disease or arthritis    Stress    Pregnancy, or illness like appendicitis, bladder or kidney infection, pelvic infections   Home care    For neck pain: Use a comfortable pillow that supports the head and keeps the spine in a neutral position. The position of the head should not be tilted forward or backward.    When in bed, try to find a position of comfort. A firm mattress is best. Try lying flat on your back with pillows under your knees. You can also try lying on your side with your knees bent up towards your chest and a pillow between your knees.    At first, do not try to stretch out the sore spots. If there is a strain, it is not like the good soreness you get after exercising without an injury. In this case, stretching may make it worse.    Avoid prolonged sitting, long car rides or travel. This puts more stress on the lower back than standing or walking.    During the first 24 to 72 hours after an injury, apply an ice pack to the painful area for 20 minutes and then remove it for 20 minutes over a period of 60 to 90 minutes or several times a day.     You can alternate ice and heat therapies. Talk with your healthcare provider about the best treatment for your back or neck pain. As a safety precaution, do not use a heating pad at bedtime. Sleeping with a heating pad can lead to skin burns or tissue damage.    Therapeutic massage can help relax the back and neck muscles without stretching them.    Be aware of safe lifting methods and do not lift anything over 15 pounds until all the pain is gone.  Medications  Talk to your healthcare provider before using medicine, especially if you have other medical problems or are taking other medicines.    You may use over-the-counter medicine to control pain, unless another pain medicine was prescribed. If you have chronic conditions like diabetes, liver or kidney disease, stomach ulcers,  gastrointestinal bleeding, or are taking blood thinner  medicines.    Be careful if you are given pain medicines, narcotics, or medicine for muscle spasm. They can cause drowsiness, and can affect your coordination, reflexes, and judgment. Do not drive or operate heavy machinery.  Follow-up care  Follow up with your healthcare provider, or as advised. Physical therapy or further tests may be needed.  If X-rays were taken, you will be notified of any new findings that may affect your care.  Call 911  Seek emergency medical care if any of the following occur:    Trouble breathing    Confusion    Very drowsy or trouble awakening    Fainting or loss of consciousness    Rapid or very slow heart rate    Loss of bowel or bladder control  When to seek medical advice  Call your healthcare provider right away if any of these occur:    Pain becomes worse or spreads into your arms or legs    Weakness, numbness or pain in one or both arms or legs    Numbness in the groin area    Difficulty walking    Fever of 100.4 F (38 C) or higher, or as directed by your healthcare provider  Date Last Reviewed: 7/1/2016 2000-2017 [employer]. [address] [place] [address]. All rights reserved. This information is not intended as a substitute for professional medical care. Always follow your healthcare professional's instructions.        When You Have Low Back Pain  Caring for Your Back  You are not alone.  Low back pain is very common. Nearly half of all adults have low back pain in any given year.  The good news is that back pain is rarely a danger to your health. Most people can manage their back pain on their own and about half of them start feeling better within 2 weeks. In 9 out of 10 cases, low back pain goes away or no longer limits daily activity within 6 weeks.  Your outlook is good!  Your symptoms tell us that your low back pain is most likely not a danger to you. Most of the time we do not know the exact cause of low back pain, even if you see a doctor or  have an MRI. However, treatment can still work without knowing the cause of the pain. Less than 1 in 100 people need surgery for their back pain.  What can I do about my low back pain?  There are three things you can do to ease low back pain and help it go away.    Use heat or cold packs.    Take medicine as directed.    Use positions, movements and exercises.  Using heat or cold packs  Try cold packs or gentle heat to ease your pain. Use whichever gives you the most relief. Apply the cold pack or heat for 15 minutes at a time, as often as needed.  Taking medicine    If your doctor has prescribed medicine, be sure to follow the directions.    If you take over-the-counter medicine, read and follow the directions.    Talk to your doctor if you have any questions.  Using positions, movements and exercises  Research tells us that moving your joints and muscles can help you recover from back pain. Such activity should be simple and gentle.  Use the positions in the photos as well as walking to help relieve your pain. Try taking a short walk every 3 to 4 hours during the day. Walk for a few minutes inside your home or take longer walks outside, on a treadmill or at a mall. Slowly increase the amount of time you walk.  Expect discomfort when you begin, but it should lessen as your back starts to heal. When your back feels better, walk daily to keep your back and body healthy.  Finding a comfortable position  When your back pain is new, certain positions will ease your pain. Gently try each of the positions below until you find one that is helpful. Once you find a position of comfort, use it as often as you like when you are resting. You will recover faster if you combine rest with activity.         When should I call my doctor?  Your back pain should improve over the first couple of weeks. As it improves, you should be able to return to your normal activities. But call your doctor if:    You have a sudden change in your  ability to control?your bladder or bowels.    You feel tingling in your groin or legs.    The pain spreads down your leg and into your foot.    Your toes, feet or leg muscles feel weak.    You feel generally unwell or sick.    Your pain does not get better or gets worse.  For informational purposes only. Not to replace the advice of your health care provider.  Copyright   2013 [employer] [facility]. All rights reserved. [employer] [id number] - REV 03/16.

## 2018-01-09 NOTE — ED NOTES
Patient on 1/8/18 at approx 0200 injured her lower back while assisting with a patient. 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 Patient did inform her boss in the morning the she had hurt her back. After 24 hours later her back is hurting worse. Tried Tylenol yesterday.

## 2018-01-09 NOTE — ED AVS SNAPSHOT
[facility] Emergency Department    [address]     [place] [address]    Phone:  [phone]    Fax:  [phone]                                       Linette Rosa   MRN: [id number]    Department:  [facility] Emergency Department   Date of Visit:  1/9/2018           Patient Information     Date Of Birth          1995        Your diagnoses for this visit were:     Acute bilateral low back pain without sciatica        You were seen by Emelia, Jerman HUTCHISON MD.      Follow-up Information     Follow up with No Ref-Primary, Physician.    Why:  As needed        Follow up with [facility] Emergency Department.    Specialty:  EMERGENCY MEDICINE    Why:  If symptoms worsen    Contact information:    [address] [facility] [place]   [place] [address] [phone]    Additional information:    From [address]: Exit at [address] on [address] side of [place]. Turn right on [address]. Turn left at stoplight on [address] [place] [address]. [facility] will be in view two blocks ahead        Discharge Instructions         General Neck and Back Pain    Both neck and back pain are usually caused by injury to the muscles or ligaments of the spine. Sometimes the disks that separate each bone of the spine may cause pain by pressing on a nearby nerve. Back and neck pain may appear after a sudden twisting or bending force (such as in a car accident), or sometimes after a simple awkward movement. In either case, muscle spasm is often present and adds to the pain.  Acute neck and back pain usually gets better in 1 to 2 weeks. Pain related to disk disease, arthritis in the spinal joints or spinal stenosis (narrowing of the spinal canal) can become chronic and last for months or years.  Back and neck pain are common problems. Most people feel better in 1 or 2 weeks, and most of the rest in 1 to 2 months. Most people can remain active.  People experience and describe pain differently.    Pain can be  sharp, stabbing, shooting, aching, cramping, or burning    Movement, standing, bending, lifting, sitting, or walking may worsen the pain    Pain can be localized to one spot or area, or it can be more generalized    Pain can spread or radiate upwards, downwards, to the front, or go down your arms    Muscle spasm may occur.  Most of the time mechanical problems with the muscles or spine cause the pain. it is usually caused by an injury, whether known or not, to the muscles or ligaments. While illnesses can cause back pain, it is usually not caused by a serious illness. Pain is usually related to physical activity, whether sports, exercise, work, or normal activity. Sometimes it can occur without an identifiable cause. This can happen simply by stretching or moving wrong, without noting pain at the time. Other causes include:    Overexertion, lifting, pushing, pulling incorrectly or too aggressively.    Sudden twisting, bending or stretching from an accident (car or fall), or accidental movement.    Poor posture    Poor conditioning, lack of regular exercise    Spinal disc disease or arthritis    Stress    Pregnancy, or illness like appendicitis, bladder or kidney infection, pelvic infections   Home care    For neck pain: Use a comfortable pillow that supports the head and keeps the spine in a neutral position. The position of the head should not be tilted forward or backward.    When in bed, try to find a position of comfort. A firm mattress is best. Try lying flat on your back with pillows under your knees. You can also try lying on your side with your knees bent up towards your chest and a pillow between your knees.    At first, do not try to stretch out the sore spots. If there is a strain, it is not like the good soreness you get after exercising without an injury. In this case, stretching may make it worse.    Avoid prolonged sitting, long car rides or travel. This puts more stress on the lower back than  standing or walking.    During the first 24 to 72 hours after an injury, apply an ice pack to the painful area for 20 minutes and then remove it for 20 minutes over a period of 60 to 90 minutes or several times a day.     You can alternate ice and heat therapies. Talk with your healthcare provider about the best treatment for your back or neck pain. As a safety precaution, do not use a heating pad at bedtime. Sleeping with a heating pad can lead to skin burns or tissue damage.    Therapeutic massage can help relax the back and neck muscles without stretching them.    Be aware of safe lifting methods and do not lift anything over 15 pounds until all the pain is gone.  Medications  Talk to your healthcare provider before using medicine, especially if you have other medical problems or are taking other medicines.    You may use over-the-counter medicine to control pain, unless another pain medicine was prescribed. If you have chronic conditions like diabetes, liver or kidney disease, stomach ulcers,  gastrointestinal bleeding, or are taking blood thinner medicines.    Be careful if you are given pain medicines, narcotics, or medicine for muscle spasm. They can cause drowsiness, and can affect your coordination, reflexes, and judgment. Do not drive or operate heavy machinery.  Follow-up care  Follow up with your healthcare provider, or as advised. Physical therapy or further tests may be needed.  If X-rays were taken, you will be notified of any new findings that may affect your care.  Call 911  Seek emergency medical care if any of the following occur:    Trouble breathing    Confusion    Very drowsy or trouble awakening    Fainting or loss of consciousness    Rapid or very slow heart rate    Loss of bowel or bladder control  When to seek medical advice  Call your healthcare provider right away if any of these occur:    Pain becomes worse or spreads into your arms or legs    Weakness, numbness or pain in one or both  arms or legs    Numbness in the groin area    Difficulty walking    Fever of 100.4 F (38 C) or higher, or as directed by your healthcare provider  Date Last Reviewed: 7/1/2016 2000-2017 [employer]. [address] [place] [address], [place] [address]. All rights reserved. This information is not intended as a substitute for professional medical care. Always follow your healthcare professional's instructions.        When You Have Low Back Pain  Caring for Your Back  You are not alone.  Low back pain is very common. Nearly half of all adults have low back pain in any given year.  The good news is that back pain is rarely a danger to your health. Most people can manage their back pain on their own and about half of them start feeling better within 2 weeks. In 9 out of 10 cases, low back pain goes away or no longer limits daily activity within 6 weeks.  Your outlook is good!  Your symptoms tell us that your low back pain is most likely not a danger to you. Most of the time we do not know the exact cause of low back pain, even if you see a doctor or have an MRI. However, treatment can still work without knowing the cause of the pain. Less than 1 in 100 people need surgery for their back pain.  What can I do about my low back pain?  There are three things you can do to ease low back pain and help it go away.    Use heat or cold packs.    Take medicine as directed.    Use positions, movements and exercises.  Using heat or cold packs  Try cold packs or gentle heat to ease your pain. Use whichever gives you the most relief. Apply the cold pack or heat for 15 minutes at a time, as often as needed.  Taking medicine    If your doctor has prescribed medicine, be sure to follow the directions.    If you take over-the-counter medicine, read and follow the directions.    Talk to your doctor if you have any questions.  Using positions, movements and exercises  Research tells us that moving your joints and muscles can help you  recover from back pain. Such activity should be simple and gentle.  Use the positions in the photos as well as walking to help relieve your pain. Try taking a short walk every 3 to 4 hours during the day. Walk for a few minutes inside your home or take longer walks outside, on a treadmill or at a mall. Slowly increase the amount of time you walk.  Expect discomfort when you begin, but it should lessen as your back starts to heal. When your back feels better, walk daily to keep your back and body healthy.  Finding a comfortable position  When your back pain is new, certain positions will ease your pain. Gently try each of the positions below until you find one that is helpful. Once you find a position of comfort, use it as often as you like when you are resting. You will recover faster if you combine rest with activity.         When should I call my doctor?  Your back pain should improve over the first couple of weeks. As it improves, you should be able to return to your normal activities. But call your doctor if:    You have a sudden change in your ability to control?your bladder or bowels.    You feel tingling in your groin or legs.    The pain spreads down your leg and into your foot.    Your toes, feet or leg muscles feel weak.    You feel generally unwell or sick.    Your pain does not get better or gets worse.  For informational purposes only. Not to replace the advice of your health care provider.  Copyright   2013 [facility]. All rights reserved. [employer] 093233 - REV 03/16.      24 Hour Appointment Hotline       To make an appointment at any [facility], call [phone] ([phone]). If you don't have a family doctor or clinic, we will help you find one. [facility] are conveniently located to serve the needs of you and your family.             Review of your medicines      START taking        Dose / Directions Last dose taken    naproxen 500 MG tablet   Commonly known as:   NAPROSYN   Dose:  500 mg   Quantity:  16 tablet        Take 1 tablet (500 mg) by mouth 2 times daily (with meals) for 8 days   Refills:  0          Our records show that you are taking the medicines listed below. If these are incorrect, please call your family doctor or clinic.        Dose / Directions Last dose taken    IBUPROFEN PO   Dose:  800 mg        Take 800 mg by mouth   Refills:  0        TYLENOL PO   Dose:  1000 mg        Take 1,000 mg by mouth   Refills:  0                Prescriptions were sent or printed at these locations (1 Prescription)                   [facility] [place] - [address]    [address] , [facility] [address]    Telephone:  [phone]   Fax:  [phone]   Hours:                  Printed at Department/Unit printer (1 of 1)         naproxen (NAPROSYN) 500 MG tablet                Orders Needing Specimen Collection     None      Pending Results     No orders found from 1/7/2018 to 1/10/2018.            Pending Culture Results     No orders found from 1/7/2018 to 1/10/2018.            Pending Results Instructions     If you had any lab results that were not finalized at the time of your Discharge, you can call the ED Lab Result RN at [phone]. You will be contacted by this team for any positive Lab results or changes in treatment. The nurses are available 7 days a week from 10A to 6:30P.  You can leave a message 24 hours per day and they will return your call.        Thank you for choosing [facility]       Thank you for choosing [facility] for your care. Our goal is always to provide you with excellent care. Hearing back from our patients is one way we can continue to improve our services. Please take a few minutes to complete the written survey that you may receive in the mail after you visit with us. Thank you!        [employer] Information     [employer] lets you send messages to your doctor, view your test results, renew your prescriptions, schedule  "appointments and more. To sign up, go to [email or web address] . Click on \"Log in\" on the left side of the screen, which will take you to the Welcome page. Then click on \"Sign up Now\" on the right side of the page.     You will be asked to enter the access code listed below, as well as some personal information. Please follow the directions to create your username and password.     Your access code is: [email or web address]  Expires: 2018 12:12 AM     Your access code will  in 90 days. If you need help or a new code, please call your [place] clinic or [phone].        Care EveryWhere ID     This is your Care EveryWhere ID. This could be used by other organizations to access your [facility] medical records  [id number]        Equal Access to Services     GALE NASCIMENTO : Kerline Vo, guru fuchs, arcelia reyes, chandra wiggins . So [facility] [phone].    ATENCIÓN: Si habla español, tiene a oscar disposición servicios gratuitos de asistencia lingüística. Llame al [phone].    We comply with applicable federal civil rights laws and [place] laws. We do not discriminate on the basis of race, color, national origin, age, disability, sex, sexual orientation, or gender identity.            After Visit Summary       This is your record. Keep this with you and show to your community pharmacist(s) and doctor(s) at your next visit.                  "

## 2018-01-09 NOTE — ED AVS SNAPSHOT
[facility] Emergency Department    [address] [place] [address]    Phone:  [phone]    Fax:  [phone]                                       Linette Rosa   MRN: [id number]    Department:  [facility] Emergency Department   Date of Visit:  1/9/2018           After Visit Summary Signature Page     I have received my discharge instructions, and my questions have been answered. I have discussed any challenges I see with this plan with the nurse or doctor.    ..........................................................................................................................................  Patient/Patient Representative Signature      ..........................................................................................................................................  Patient Representative Print Name and Relationship to Patient    ..................................................               ................................................  Date                                            Time    ..........................................................................................................................................  Reviewed by Signature/Title    ...................................................              ..............................................  Date                                                            Time

## 2018-01-09 NOTE — ED NOTES
Patient here after an injury at work, she is having lower back pain that radiates to her legs when she sits.

## 2018-01-16 ENCOUNTER — OFFICE VISIT (OUTPATIENT)
Dept: FAMILY MEDICINE | Facility: CLINIC | Age: 23
End: 2018-01-16
Payer: OTHER MISCELLANEOUS

## 2018-01-16 VITALS
BODY MASS INDEX: 41.02 KG/M2 | DIASTOLIC BLOOD PRESSURE: 76 MMHG | TEMPERATURE: 97.1 F | SYSTOLIC BLOOD PRESSURE: 120 MMHG | OXYGEN SATURATION: 99 % | WEIGHT: 293 LBS | HEIGHT: 71 IN | HEART RATE: 104 BPM

## 2018-01-16 DIAGNOSIS — S39.012D BACK STRAIN, SUBSEQUENT ENCOUNTER: Primary | ICD-10-CM

## 2018-01-16 PROCEDURE — 99213 OFFICE O/P EST LOW 20 MIN: CPT | Performed by: FAMILY MEDICINE

## 2018-01-16 RX ORDER — PREDNISONE 20 MG/1
20 TABLET ORAL DAILY
Qty: 5 TABLET | Refills: 0 | Status: SHIPPED | OUTPATIENT
Start: 2018-01-16 | End: 2018-01-21

## 2018-01-16 RX ORDER — NAPROXEN 500 MG/1
500 TABLET ORAL 2 TIMES DAILY PRN
Qty: 60 TABLET | Refills: 0 | Status: SHIPPED | OUTPATIENT
Start: 2018-01-16 | End: 2018-02-02

## 2018-01-16 RX ORDER — CYCLOBENZAPRINE HCL 10 MG
10 TABLET ORAL 2 TIMES DAILY PRN
Qty: 30 TABLET | Refills: 1 | Status: SHIPPED | OUTPATIENT
Start: 2018-01-16 | End: 2018-02-02

## 2018-01-16 ASSESSMENT — PAIN SCALES - GENERAL: PAINLEVEL: EXTREME PAIN (8)

## 2018-01-16 NOTE — MR AVS SNAPSHOT
"              After Visit Summary   2018    Linette Rosa    MRN: [id number]           Patient Information     Date Of Birth          1995        Visit Information        Provider Department      2018 10:20 AM Mart Bronson MD [facility]        Today's Diagnoses     Back strain, subsequent encounter    -  1       Follow-ups after your visit        Follow-up notes from your care team     Return if symptoms worsen or fail to improve.      Who to contact     If you have questions or need follow up information about today's clinic visit or your schedule please contact [facility] directly at [phone].  Normal or non-critical lab and imaging results will be communicated to you by [employer], letter or phone within 4 business days after the clinic has received the results. If you do not hear from us within 7 days, please contact the clinic through [employer] or phone. If you have a critical or abnormal lab result, we will notify you by phone as soon as possible.  Submit refill requests through [employer] or call your pharmacy and they will forward the refill request to us. Please allow 3 business days for your refill to be completed.          Additional Information About Your Visit        MyChart Information     [employer] lets you send messages to your doctor, view your test results, renew your prescriptions, schedule appointments and more. To sign up, go to [email or web address] . Click on \"Log in\" on the left side of the screen, which will take you to the Welcome page. Then click on \"Sign up Now\" on the right side of the page.     You will be asked to enter the access code listed below, as well as some personal information. Please follow the directions to create your username and password.     Your access code is: [email or web address]  Expires: 2018 12:12 AM     Your access code will  in 90 days. If you need help or a new code, please call your [facility] or " "[phone].        Care EveryWhere ID     This is your Care EveryWhere ID. This could be used by other organizations to access your [place] medical records  [id number]        Your Vitals Were     Pulse Temperature Height Last Period Pulse Oximetry BMI (Body Mass Index)    104 97.1  F (36.2  C) (Temporal) 5' 11\" (1.803 m) 12/09/2017 99% 46.44 kg/m2       Blood Pressure from Last 3 Encounters:   01/16/18 120/76   01/09/18 155/83   11/03/17 (!) 184/96    Weight from Last 3 Encounters:   01/16/18 (!) 333 lb (151 kg)   01/09/18 290 lb (131.5 kg)   08/31/17 (!) 326 lb (147.9 kg)              Today, you had the following     No orders found for display         Today's Medication Changes          These changes are accurate as of: 1/16/18  3:00 PM.  If you have any questions, ask your nurse or doctor.               Start taking these medicines.        Dose/Directions    cyclobenzaprine 10 MG tablet   Commonly known as:  FLEXERIL   Used for:  Back strain, subsequent encounter   Started by:  Mart Bronson MD        Dose:  10 mg   Take 1 tablet (10 mg) by mouth 2 times daily as needed for muscle spasms   Quantity:  30 tablet   Refills:  1       predniSONE 20 MG tablet   Commonly known as:  DELTASONE   Used for:  Back strain, subsequent encounter   Started by:  Mart Bronson MD        Dose:  20 mg   Take 1 tablet (20 mg) by mouth daily   Quantity:  5 tablet   Refills:  0         These medicines have changed or have updated prescriptions.        Dose/Directions    naproxen 500 MG tablet   Commonly known as:  NAPROSYN   This may have changed:    - when to take this  - reasons to take this   Used for:  Back strain, subsequent encounter   Changed by:  Mart Bronson MD        Dose:  500 mg   Take 1 tablet (500 mg) by mouth 2 times daily as needed for moderate pain   Quantity:  60 tablet   Refills:  0            Where to get your medicines      These medications were sent to [facility] [place] - [address]   [address] [place] [address]     Phone:  [phone]     cyclobenzaprine 10 MG tablet    naproxen 500 MG tablet    predniSONE 20 MG tablet                Primary Care Provider Fax #    Physician No Ref-Primary [phone]       No address on file        Equal Access to Services     GALE AZAM : Hadburt tonja hussein kikio Soomaali, waaxda luqadaha, qaybta kaalmada adeegyada, waxmike jose cirochristian malagonbaldev cervantse feliciano saab. So [facility] [phone].    ATENCIÓN: Si habla español, tiene a oscar disposición servicios gratuitos de asistencia lingüística. Llame al [phone].    We comply with applicable federal civil rights laws and [place] laws. We do not discriminate on the basis of race, color, national origin, age, disability, sex, sexual orientation, or gender identity.            Thank you!     Thank you for choosing [facility]  for your care. Our goal is always to provide you with excellent care. Hearing back from our patients is one way we can continue to improve our services. Please take a few minutes to complete the written survey that you may receive in the mail after your visit with us. Thank you!             Your Updated Medication List - Protect others around you: Learn how to safely use, store and throw away your medicines at www.disposemymeds.org.          This list is accurate as of: 1/16/18  3:00 PM.  Always use your most recent med list.                   Brand Name Dispense Instructions for use Diagnosis    cyclobenzaprine 10 MG tablet    FLEXERIL    30 tablet    Take 1 tablet (10 mg) by mouth 2 times daily as needed for muscle spasms    Back strain, subsequent encounter       IBUPROFEN PO      Take 800 mg by mouth        naproxen 500 MG tablet    NAPROSYN    60 tablet    Take 1 tablet (500 mg) by mouth 2 times daily as needed for moderate pain    Back strain, subsequent encounter       predniSONE 20 MG tablet    DELTASONE    5 tablet    Take 1 tablet (20 mg) by mouth daily     Back strain, subsequent encounter       TYLENOL PO      Take 1,000 mg by mouth

## 2018-01-16 NOTE — PROGRESS NOTES
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 Has been on light duty since     Linette is here today for follow-up on her back pain.  This is a work comp case.  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  Happened about a week ago.  No pain or problem when it happned.  Couple hours later she started having lower back pain - was not too bad.  She went home and did fine until next day, the pain was significantly worse.  Pain was mainly on her lower back which was very stiff.  She could hardly got out of bed.  She went to the ER and was diagnosed with muscle spasm.  Was treated with naproxen.  Been taking naproxen twice a day with no help.  Been doing light duty job.  Still has a lot of back pain with back stiffness.  Never had this before.  Pain starts on the low back which radiatse up to his shoulder and and neck.  If she sits too long in the car, she feels the pain on her right leg.  No histories of back injury or back problems.  No weakness.  Lifting, sitting too long or standing too long would make the pain worse.  She cannot sleep very well because of not feeling comfortable.  No fever or chills.  No other concern.      Problem list and histories reviewed & adjusted, as indicated.  Additional history: as documented    Current Outpatient Prescriptions   Medication Sig Dispense Refill     cyclobenzaprine (FLEXERIL) 10 MG tablet Take 1 tablet (10 mg) by mouth 2 times daily as needed for muscle spasms 30 tablet 1     predniSONE (DELTASONE) 20 MG tablet Take 1 tablet (20 mg) by mouth daily 5 tablet 0     naproxen (NAPROSYN) 500 MG tablet Take 1 tablet (500 mg) by mouth 2 times daily as needed for moderate pain  "60 tablet 0     Acetaminophen (TYLENOL PO) Take 1,000 mg by mouth       IBUPROFEN PO Take 800 mg by mouth       Allergies   Allergen Reactions     [place] Oil [Fish Oil] Swelling       Reviewed and updated as needed this visit by clinical staff  Tobacco  Allergies  Meds  Soc Hx      Reviewed and updated as needed this visit by Provider         ROS:  Constitutional, HEENT, cardiovascular, pulmonary, gi and gu systems are negative, except as otherwise noted.      OBJECTIVE:   /76 (BP Location: Left arm, Patient Position: Chair, Cuff Size: Adult Large)  Pulse 104  Temp 97.1  F (36.2  C) (Temporal)  Ht 5' 11\" (1.803 m)  Wt (!) 333 lb (151 kg)  LMP 12/09/2017  SpO2 99%  BMI 46.44 kg/m2  Body mass index is 46.44 kg/(m^2).   GENERAL: healthy, alert and no distress  RESP: lungs clear to auscultation - no rales, rhonchi or wheezes  CV: regular rate and rhythm, normal S1 S2, no S3 or S4, no murmur,.  MS: walk without limping. All 4 extremities as equally in strength.  All joint are in the normal range motion and hips/knees exams were normal. No tender with palpation to knees, hips, or ankles. Negative straight leg maneuver. Mild tender with guarding with palpation to the lower spine and its para-vertebral muscle.  No gross musculoskeletal defects and there is no edema.  NEURO: no focal weakness. Normal DTR at knees.    Diagnostic Test Results:  No results found for this or any previous visit (from the past 24 hour(s)).    ASSESSMENT/PLAN:     1. Back strain, subsequent encounter  She has back strain with muscle spasm.  Discussed with her about the nature of the condition.  Continue normal activities as tolerated.  Continue with Naproxen or Tylenol as needed for pain. Stretching exercise demonstrated and recommended.  Start Flexeril and 5 days course of Prednisone and side effect discussed.  Call in or follow up if symptoms persist or worse.  Continue with light duty and the workability form was filled out on " her behalf for 1 week, may advance as tolerated.  All of her questions were answered.    - cyclobenzaprine (FLEXERIL) 10 MG tablet; Take 1 tablet (10 mg) by mouth 2 times daily as needed for muscle spasms  Dispense: 30 tablet; Refill: 1  - predniSONE (DELTASONE) 20 MG tablet; Take 1 tablet (20 mg) by mouth daily  Dispense: 5 tablet; Refill: 0  - naproxen (NAPROSYN) 500 MG tablet; Take 1 tablet (500 mg) by mouth 2 times daily as needed for moderate pain  Dispense: 60 tablet; Refill: 0    Mart Aranda Mai, MD  [facility]

## 2018-01-16 NOTE — NURSING NOTE
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    Weight as of this encounter: 333 lb (151 kg)..    BP completed using cuff size: suly Wong CMA  "

## 2018-01-21 ENCOUNTER — HOSPITAL ENCOUNTER (EMERGENCY)
Facility: CLINIC | Age: 23
Discharge: HOME | End: 2018-01-21
Attending: PHYSICIAN ASSISTANT | Admitting: PHYSICIAN ASSISTANT
Payer: OTHER MISCELLANEOUS

## 2018-01-21 VITALS
RESPIRATION RATE: 18 BRPM | TEMPERATURE: 98.6 F | OXYGEN SATURATION: 97 % | SYSTOLIC BLOOD PRESSURE: 157 MMHG | BODY MASS INDEX: 46.3 KG/M2 | DIASTOLIC BLOOD PRESSURE: 98 MMHG | HEART RATE: 100 BPM | WEIGHT: 293 LBS

## 2018-01-21 DIAGNOSIS — S39.012D STRAIN OF LUMBAR REGION, SUBSEQUENT ENCOUNTER: ICD-10-CM

## 2018-01-21 LAB
ALBUMIN UR-MCNC: NEGATIVE MG/DL
APPEARANCE UR: CLEAR
BILIRUB UR QL STRIP: NEGATIVE
COLOR UR AUTO: YELLOW
GLUCOSE UR STRIP-MCNC: NEGATIVE MG/DL
HCG UR QL: NEGATIVE
HGB UR QL STRIP: NEGATIVE
KETONES UR STRIP-MCNC: NEGATIVE MG/DL
LEUKOCYTE ESTERASE UR QL STRIP: NEGATIVE
MUCOUS THREADS #/AREA URNS LPF: PRESENT /LPF
NITRATE UR QL: NEGATIVE
PH UR STRIP: 5 PH (ref 5–7)
RBC #/AREA URNS AUTO: 0 /HPF (ref 0–2)
SOURCE: ABNORMAL
SP GR UR STRIP: 1.03 (ref 1–1.03)
SQUAMOUS #/AREA URNS AUTO: 2 /HPF (ref 0–1)
UROBILINOGEN UR STRIP-MCNC: 2 MG/DL (ref 0–2)
WBC #/AREA URNS AUTO: 1 /HPF (ref 0–2)

## 2018-01-21 PROCEDURE — 81001 URINALYSIS AUTO W/SCOPE: CPT | Performed by: PHYSICIAN ASSISTANT

## 2018-01-21 PROCEDURE — 96372 THER/PROPH/DIAG INJ SC/IM: CPT | Performed by: PHYSICIAN ASSISTANT

## 2018-01-21 PROCEDURE — 25000132 ZZH RX MED GY IP 250 OP 250 PS 637: Performed by: PHYSICIAN ASSISTANT

## 2018-01-21 PROCEDURE — 99285 EMERGENCY DEPT VISIT HI MDM: CPT | Mod: Z6 | Performed by: PHYSICIAN ASSISTANT

## 2018-01-21 PROCEDURE — 99285 EMERGENCY DEPT VISIT HI MDM: CPT | Mod: 25 | Performed by: PHYSICIAN ASSISTANT

## 2018-01-21 PROCEDURE — 81025 URINE PREGNANCY TEST: CPT | Performed by: PHYSICIAN ASSISTANT

## 2018-01-21 PROCEDURE — 25000128 H RX IP 250 OP 636: Performed by: PHYSICIAN ASSISTANT

## 2018-01-21 RX ORDER — OXYCODONE HYDROCHLORIDE 5 MG/1
5 TABLET ORAL EVERY 6 HOURS PRN
Qty: 8 TABLET | Refills: 0 | Status: SHIPPED | OUTPATIENT
Start: 2018-01-21 | End: 2018-01-22

## 2018-01-21 RX ORDER — OXYCODONE HYDROCHLORIDE 5 MG/1
10 TABLET ORAL ONCE
Status: COMPLETED | OUTPATIENT
Start: 2018-01-21 | End: 2018-01-21

## 2018-01-21 RX ORDER — KETOROLAC TROMETHAMINE 30 MG/ML
60 INJECTION, SOLUTION INTRAMUSCULAR; INTRAVENOUS ONCE
Status: COMPLETED | OUTPATIENT
Start: 2018-01-21 | End: 2018-01-21

## 2018-01-21 RX ORDER — DIAZEPAM 5 MG
10 TABLET ORAL ONCE
Status: COMPLETED | OUTPATIENT
Start: 2018-01-21 | End: 2018-01-21

## 2018-01-21 RX ADMIN — OXYCODONE HYDROCHLORIDE 10 MG: 5 TABLET ORAL at 20:17

## 2018-01-21 RX ADMIN — DIAZEPAM 10 MG: 5 TABLET ORAL at 18:17

## 2018-01-21 RX ADMIN — KETOROLAC TROMETHAMINE 60 MG: 30 INJECTION, SOLUTION INTRAMUSCULAR at 18:18

## 2018-01-21 RX ADMIN — HYDROMORPHONE HYDROCHLORIDE 1 MG: 1 INJECTION, SOLUTION INTRAMUSCULAR; INTRAVENOUS; SUBCUTANEOUS at 19:18

## 2018-01-21 ASSESSMENT — ENCOUNTER SYMPTOMS
FEVER: 0
BACK PAIN: 1
WEAKNESS: 0
NUMBNESS: 0
VOMITING: 0
NAUSEA: 0

## 2018-01-21 NOTE — ED NOTES
Patient c/o worsening back and neck pain related to a work related injury approximately 2 weeks ago.

## 2018-01-21 NOTE — ED AVS SNAPSHOT
[facility] Emergency Department    [address] [place] [address]    Phone:  [phone]    Fax:  [phone]                                       Linette Rosa   MRN: [id number]    Department:  [facility] Emergency Department   Date of Visit:  1/21/2018           Patient Information     Date Of Birth          1995        Your diagnoses for this visit were:     Strain of lumbar region, subsequent encounter        You were seen by Saima Newton PA-C.      Follow-up Information     Follow up with Mart Bronson MD In 1 day.    Specialty:  Family Practice    Why:  For ER follow up    Contact information:    [address] [place] [id number] [phone]          Follow up with [facility] Emergency Department.    Specialty:  EMERGENCY MEDICINE    Why:  If symptoms worsen    Contact information:    [address] [place] [id number] [phone]    Additional information:    From [address]: Exit at [address] on [place] side of [place]. Turn right on [address]. Turn left at stoplight on [address]. [facility] will be in view two blocks ahead        Discharge Instructions         Understanding Lumbosacral Strain    Lumbosacral strain is a medical term for an injury that causes low back pain. The lumbosacral area (low back) is between the bottom of the ribcage and the top of the buttocks. A strain is tearing of muscles and tendons. These tears can be very small but still cause pain.  How a lumbosacral strain happens  Muscles and tendons connected to the spine can be strained in a number of ways:    Sitting or standing in the same position for long periods of time. This can harm the low back over time. Poor posture can make low back pain more likely.    Moving the muscles and tendons past their usual range of motion. This can cause a sudden injury. This can happen when you twist, bend over, or lift something heavy. Not using  correct technique for sports or tasks like lifting can make back injury more likely.    Accidents or falls  Lumbosacral strain can be caused by other problems, but these are less common.  Symptoms of lumbosacral strain  Symptoms may include:    Pain in the back, often on one side    Pain that gets worse with movement and gets better with rest    Inability to move as freely as usual    Swelling, slight redness, and skin warmth in the painful area  Treatment for lumbosacral strain  Low back pain often goes away by itself within several weeks. But it often comes back. Treatment focuses on reducing pain and avoiding further injury. Bed rest is usually not recommended for low back pain. Treatments may include:    Avoiding or changing the action that caused the problem. This helps prevent injuring the tissues again.    Prescription or over-the-counter pain medicines. These help reduce inflammation, swelling, and pain.    Cold or heat packs. These help reduce pain and swelling.    Stretching and other exercises. These improve flexibility and strength.    Physical therapy. This usually includes exercises and other treatments.    Injections of medicine. This may relieve symptoms.  If these treatments do not relieve symptoms, your healthcare provider may order imaging tests to learn more about the problem. Sometimes you may need surgery.  Possible complications of lumbosacral strain  If the cause of the pain is not addressed, symptoms may return or get worse. Follow your healthcare provider s instructions on lifestyle changes and treating your back.     When to call your healthcare provider  Call your healthcare provider right away if you have any of these:    Fever of 100.4 F (38 C) or higher, or as directed    Numbness, tingling, or weakness    Problems with bowel or bladder control, or problems having sex    Pain that does not go away, or gets worse    New symptoms           Future Appointments        Provider Department  Dept Phone Center    1/22/2018 1:00 PM Lorraine Hopkins PA-C [facility] [phone] [facility]      24 Hour Appointment Hotline       To make an appointment at any [facility], call [phone] ([phone]). If you don't have a family doctor or clinic, we will help you find one. [facility] are conveniently located to serve the needs of you and your family.             Review of your medicines      START taking        Dose / Directions Last dose taken    oxyCODONE IR 5 MG tablet   Commonly known as:  ROXICODONE   Dose:  5 mg   Quantity:  8 tablet        Take 1 tablet (5 mg) by mouth every 6 hours as needed for pain   Refills:  0          Our records show that you are taking the medicines listed below. If these are incorrect, please call your family doctor or clinic.        Dose / Directions Last dose taken    cyclobenzaprine 10 MG tablet   Commonly known as:  FLEXERIL   Dose:  10 mg   Quantity:  30 tablet        Take 1 tablet (10 mg) by mouth 2 times daily as needed for muscle spasms   Refills:  1        IBUPROFEN PO   Dose:  800 mg        Take 800 mg by mouth   Refills:  0        naproxen 500 MG tablet   Commonly known as:  NAPROSYN   Dose:  500 mg   Quantity:  60 tablet        Take 1 tablet (500 mg) by mouth 2 times daily as needed for moderate pain   Refills:  0        TYLENOL PO   Dose:  1000 mg        Take 1,000 mg by mouth   Refills:  0                Prescriptions were sent or printed at these locations (1 Prescription)                   [facility]   [address] [address]    Telephone:  [phone]   Fax:  [phone]   Hours:                  Printed at Department/Unit printer (1 of 1)         oxyCODONE IR (ROXICODONE) 5 MG tablet                Procedures and tests performed during your visit     HCG qualitative urine (UPT)    UA with Microscopic      Orders Needing Specimen Collection     None      Pending  "Results     No orders found from 2018 to 2018.            Pending Culture Results     No orders found from 2018 to 2018.            Pending Results Instructions     If you had any lab results that were not finalized at the time of your Discharge, you can call the ED Lab Result RN at [phone]. You will be contacted by this team for any positive Lab results or changes in treatment. The nurses are available 7 days a week from 10A to 6:30P.  You can leave a message 24 hours per day and they will return your call.        Thank you for choosing [facility]       Thank you for choosing [facility] for your care. Our goal is always to provide you with excellent care. Hearing back from our patients is one way we can continue to improve our services. Please take a few minutes to complete the written survey that you may receive in the mail after you visit with us. Thank you!        [employer] Information     [employer] lets you send messages to your doctor, view your test results, renew your prescriptions, schedule appointments and more. To sign up, go to [email or web address] . Click on \"Log in\" on the left side of the screen, which will take you to the Welcome page. Then click on \"Sign up Now\" on the right side of the page.     You will be asked to enter the access code listed below, as well as some personal information. Please follow the directions to create your username and password.     Your access code is: [email or web address]  Expires: 2018 12:12 AM     Your access code will  in 90 days. If you need help or a new code, please call your [facility] clinic or [phone].        Care EveryWhere ID     This is your Care EveryWhere ID. This could be used by other organizations to access your [facility] medical records  [id number]        Equal Access to Services     GALE NASCIMENTO : guru Aguilar, chandra perez. So jaden " [phone].    ATENCIÓN: Si habla español, tiene a oscar disposición servicios gratuitos de asistencia lingüística. Llame al [phone].    We comply with applicable federal civil rights laws and [place] laws. We do not discriminate on the basis of race, color, national origin, age, disability, sex, sexual orientation, or gender identity.            After Visit Summary       This is your record. Keep this with you and show to your community pharmacist(s) and doctor(s) at your next visit.

## 2018-01-21 NOTE — ED PROVIDER NOTES
History     Chief Complaint   Patient presents with     Back Pain     HPI  Linette Rosa is a 22 year old female who presents emergency department with low back pain. 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  She had hoped  a patient who suddenly sat down and she jolted/twisted her back.  She was given naproxen for pain at that time.  Was seen in the clinic 5 days ago and given naproxen, prednisone, and Flexeril for continued pain.  They increased her work capabilities at that time to 40 pounds lifting instead of 10 pounds.  Since then she has had worsening pain throughout her low back into her right buttock and radiating down her leg.  She denies any new injury.  No weakness in the legs.  No loss of bowel or bladder control.  She was at work and they sent her home due to her back hurting.      Problem List:    Patient Active Problem List    Diagnosis Date Noted     Morbid obesity due to excess calories (H) 01/02/2017     Priority: Medium     Morbid obesity with BMI of 40.0-44.9, adult (H) 06/20/2014     Priority: Medium     Vitamin D deficiency 06/20/2014     Priority: Medium     PCOS (polycystic ovarian syndrome) 06/01/2011     Priority: Medium        Past Medical History:    History reviewed. No pertinent past medical history.    Past Surgical History:    Past Surgical History:   Procedure Laterality Date     WRIST SURGERY         Family History:    Family History   Problem Relation Age of Onset     DIABETES Mother      Unknown/Adopted Maternal Grandmother      Unknown/Adopted Maternal Grandfather      Unknown/Adopted Paternal Grandmother      Unknown/Adopted Paternal Grandfather        Social History:  Marital Status:  Single [1]  Social History   Substance Use Topics     Smoking status: Current Every Day Smoker     Packs/day: 0.50     Years: 9.00     Types: Cigarettes     Smokeless tobacco: Never Used      Comment: started age 12,  officially  when she was 16     Alcohol use 0.0 oz/week     0 Standard drinks or equivalent per week      Comment: occasionally        Medications:      oxyCODONE IR (ROXICODONE) 5 MG tablet   cyclobenzaprine (FLEXERIL) 10 MG tablet   naproxen (NAPROSYN) 500 MG tablet   Acetaminophen (TYLENOL PO)   IBUPROFEN PO         Review of Systems   Constitutional: Negative for fever.   Gastrointestinal: Negative for nausea and vomiting.   Musculoskeletal: Positive for back pain.   Neurological: Negative for weakness and numbness.   All other systems reviewed and are negative.      Physical Exam   BP:  (Error)  Pulse: 101  Heart Rate: 98  Temp: 98.6  F (37  C)  Resp: 20  Weight: (!) 150.6 kg (332 lb)  SpO2: 99 %      Physical Exam   Constitutional: She is oriented to person, place, and time.   Morbidly obese patient, tearful. Sitting on exam bed. Sits up easily without assistance.   HENT:   Head: Normocephalic and atraumatic.   Eyes: Conjunctivae are normal.   Neck: Normal range of motion.   Cardiovascular: Intact distal pulses.    Pulmonary/Chest: Effort normal. No respiratory distress.   Musculoskeletal:        Lumbar back: She exhibits decreased range of motion (very mild, can sit up on own and move/twist) and tenderness (bilateral paraspinous muscles into right buttock. No left buttock tenderness). She exhibits no bony tenderness.   Full strength in BLE, full sensation   Neurological: She is alert and oriented to person, place, and time.   Skin: Skin is warm and dry.   Psychiatric: Her mood appears anxious.   Nursing note and vitals reviewed.      ED Course     ED Course     Procedures    Results for orders placed or performed during the hospital encounter of 01/21/18 (from the past 24 hour(s))   UA with Microscopic   Result Value Ref Range    Color Urine Yellow     Appearance Urine Clear     Glucose Urine Negative NEG^Negative mg/dL    Bilirubin Urine Negative NEG^Negative    Ketones Urine Negative NEG^Negative mg/dL     Specific Gravity Urine 1.035 1.003 - 1.035    Blood Urine Negative NEG^Negative    pH Urine 5.0 5.0 - 7.0 pH    Protein Albumin Urine Negative NEG^Negative mg/dL    Urobilinogen mg/dL 2.0 0.0 - 2.0 mg/dL    Nitrite Urine Negative NEG^Negative    Leukocyte Esterase Urine Negative NEG^Negative    Source Unspecified Urine     WBC Urine 1 0 - 2 /HPF    RBC Urine 0 0 - 2 /HPF    Squamous Epithelial /HPF Urine 2 (H) 0 - 1 /HPF    Mucous Urine Present (A) NEG^Negative /LPF   HCG qualitative urine (UPT)   Result Value Ref Range    HCG Qual Urine Negative NEG^Negative       Medications   diazepam (VALIUM) tablet 10 mg (10 mg Oral Given 1/21/18 1817)   ketorolac (TORADOL) injection 60 mg (60 mg Intramuscular Given 1/21/18 1818)   HYDROmorphone (DILAUDID) injection 1 mg (1 mg Intramuscular Given 1/21/18 1918)   oxyCODONE IR (ROXICODONE) tablet 10 mg (10 mg Oral Given 1/21/18 2017)        Assessments & Plan (with Medical Decision Making)  Linette Rosa is a 22 year old female who presented to the ED complaining of persistent low back pain.  She sustained a muscular strain several weeks ago at work.  She increased lifting capabilities at work this week and exacerbated pain again.  She denies numbness or weakness in legs, no loss of bowel or bladder control.  Pain does radiate down her right leg at times.  On arrival to the ED she was mildly hypertensive and mildly tachycardic.  She had some tenderness to bilateral paraspinous muscles into her right buttock.  No midline bony tenderness.  She had full strength in her lower extremities and was observed sitting up, moving around, and walking without obvious difficulty or pain.  She was quite tearful due to symptoms however.  She was agreeable to trying oral Valium and IM Toradol initially for pain relief.  On reassessment she reported that this did nothing for her.  Subsequently given IM Dilaudid, which she said also did nothing for her.  I considered other causes of low back pain  to include UTI.  Her urine however showed no acute signs of infection. She was lastly given p.o. oxycodone and she reported this finally did help her back, was sitting eating [employer] on reassessment and appeared more comfortable. I did not think imaging indicated at this point due to no bony tenderness, no acute warning neurological signs.  I think her radiation of pain into her right leg is related to spasming in her buttock musculature causing some sciatic pain, but she would potentially benefit from MRI for further evaluation if symptoms do not improve.  She has an appointment tomorrow with her PCP which I encouraged that she keep.  She was advised to go back to lifting restrictions of no more than 10 pounds for now since increasing weight seemed to make things worse.  I also expressed the importance of weight loss because she is morbidly obese and carrying excess weight around will exacerbate back pain.  She was given a small prescription of oxycodone to use for acute pain and encouraged to use medication she has at home as well.  She was provided instructions on when to return to the ED.  Patient expressed understanding and was agreeable to this plan and to discharge.     I have reviewed the nursing notes.    I have reviewed the findings, diagnosis, plan and need for follow up with the patient.    Discharge Medication List as of 1/21/2018  8:52 PM      START taking these medications    Details   oxyCODONE IR (ROXICODONE) 5 MG tablet Take 1 tablet (5 mg) by mouth every 6 hours as needed for pain, Disp-8 tablet, R-0, Local Print             Final diagnoses:   Strain of lumbar region, subsequent encounter     Note: Chart documentation done in part with Dragon Voice Recognition software. Although reviewed after completion, some word and grammatical errors may remain.     1/21/2018   [facility] EMERGENCY DEPARTMENT     Saima Newton PA-C  01/22/18 0131

## 2018-01-21 NOTE — ED AVS SNAPSHOT
[facility] Emergency Department    [address] [place] [address]    Phone:  [phone]    Fax:  [phone]                                       Linette Rosa   MRN: [id number]    Department:  [facility] Emergency Department   Date of Visit:  1/21/2018           After Visit Summary Signature Page     I have received my discharge instructions, and my questions have been answered. I have discussed any challenges I see with this plan with the nurse or doctor.    ..........................................................................................................................................  Patient/Patient Representative Signature      ..........................................................................................................................................  Patient Representative Print Name and Relationship to Patient    ..................................................               ................................................  Date                                            Time    ..........................................................................................................................................  Reviewed by Signature/Title    ...................................................              ..............................................  Date                                                            Time

## 2018-01-22 ENCOUNTER — OFFICE VISIT (OUTPATIENT)
Dept: FAMILY MEDICINE | Facility: OTHER | Age: 23
End: 2018-01-22
Payer: OTHER MISCELLANEOUS

## 2018-01-22 VITALS
BODY MASS INDEX: 46.3 KG/M2 | SYSTOLIC BLOOD PRESSURE: 132 MMHG | DIASTOLIC BLOOD PRESSURE: 70 MMHG | HEART RATE: 105 BPM | RESPIRATION RATE: 16 BRPM | OXYGEN SATURATION: 99 % | TEMPERATURE: 98.4 F | WEIGHT: 293 LBS

## 2018-01-22 DIAGNOSIS — M54.50 ACUTE BILATERAL LOW BACK PAIN WITHOUT SCIATICA: Primary | ICD-10-CM

## 2018-01-22 DIAGNOSIS — E66.01 MORBID OBESITY DUE TO EXCESS CALORIES (H): ICD-10-CM

## 2018-01-22 DIAGNOSIS — E66.01 MORBID OBESITY WITH BMI OF 40.0-44.9, ADULT (H): ICD-10-CM

## 2018-01-22 PROCEDURE — 99214 OFFICE O/P EST MOD 30 MIN: CPT | Performed by: PHYSICIAN ASSISTANT

## 2018-01-22 RX ORDER — OXYCODONE HYDROCHLORIDE 5 MG/1
5 TABLET ORAL EVERY 6 HOURS PRN
Qty: 50 TABLET | Refills: 0 | Status: SHIPPED | OUTPATIENT
Start: 2018-01-22 | End: 2018-02-02

## 2018-01-22 ASSESSMENT — PAIN SCALES - GENERAL: PAINLEVEL: SEVERE PAIN (7)

## 2018-01-22 NOTE — MR AVS SNAPSHOT
After Visit Summary   1/22/2018    Linette Rosa    MRN: [id number]           Patient Information     Date Of Birth          1995        Visit Information        Provider Department      1/22/2018 1:00 PM Lorraine Hopkins PA-C [facility]        Today's Diagnoses     Acute bilateral low back pain without sciatica    -  1    Morbid obesity due to excess calories (H)        Morbid obesity with BMI of 40.0-44.9, adult (H)          Care Instructions    - Continue twice daily Naproxen   - Continue with 1 tablet of Flexeril at night   - Trial of Oxycodone 5 mg, 3-4/day   - Call for appointment with physical therapy  - Recheck every 2 weeks         Low Back Pain Exercises                                            Cat and camel: Get down on your hands and knees. Let your stomach sag, allowing your back to curve downward. Hold this position for 5 seconds. Then arch your back and hold for 5 seconds. Do 3 sets of 10.       Pelvic tilt: Lie on your back with your knees bent and your feet flat on the floor. Tighten your abdominal muscles and push your lower back into the floor. Hold this position for 5 seconds, then relax. Do 3 sets of 10.       Extension exercise: Lie face down on the floor for 5 minutes. If this hurts too much, lie face down with a pillow under your stomach. This should relieve your leg or back pain. When you can lie on your stomach for 5 minutes without a pillow, then you can continue with the rest of this exercise.     After lying on your stomach for 5 minutes, prop yourself up on your elbows for another 5 minutes. Lie flat again for 1 minute, then press down on your hands and extend your elbows while keeping your hips flat on the floor. Hold for 1 second and lower yourself to the floor. Repeat 10 times. Do 4 sets. Rest for 2 minutes between sets. You should have no pain in your legs when you do this, but it is normal to feel pain in your lower back. Do this  several times a day.          Developed by [employer]   Published by [employer]   Last modified: 2009-02-08   Last reviewed: 2008-07-07   This content is reviewed periodically and is subject to change as new health information becomes available. The information is intended to inform and educate and is not a replacement for medical evaluation, advice, diagnosis or treatment by a healthcare professional.   Adult Health Advisor 2009.1 Index  Adult Health Advisor 2009.1 Credits     2009 [facility] and/or its affiliates. All Rights Reserved.               Follow-ups after your visit        Additional Services     [facility] PT, HAND, AND CHIROPRACTIC REFERRAL       **This order will print in the [facility] Scheduling Office**    Physical Therapy, Hand Therapy and Chiropractic Care are available through:    *[facility]  *[facility]  *[facility]    Call one number to schedule at any of the above locations: [phone].    Your provider has referred you to: [facility] - PT and/or Chiropractic Care determined by clinical presentation at [facility] or [facility] Initial Visit    Indication/Reason for Referral: Low Back Pain  Onset of Illness: 1/9/18  Therapy Orders: Evaluate and Treat  Special Programs: None  Special Request: None    Cholo Emmanuel      Additional Comments for the Therapist or Chiropractor:     Please be aware that coverage of these services is subject to the terms and limitations of your health insurance plan.  Call member services at your health plan with any benefit or coverage questions.      Please bring the following to your appointment:    *Your personal calendar for scheduling future appointments  *Comfortable clothing                  Your next 10 appointments already scheduled     Feb 02, 2018  9:00 AM CST   [facility] Visit with Lorraine Hopkins PA-C   [facility]    [address] [place] [address]  "Ross NIETO [id number] [phone]           Bring a current list of meds and any records pertaining to this visit. For Physicals, please bring immunization records and any forms needing to be filled out. Please arrive 10 minutes early to complete paperwork.              Who to contact     If you have questions or need follow up information about today's clinic visit or your schedule please contact [facility] directly at [phone].  Normal or non-critical lab and imaging results will be communicated to you by [employer], letter or phone within 4 business days after the clinic has received the results. If you do not hear from us within 7 days, please contact the clinic through [employer] or phone. If you have a critical or abnormal lab result, we will notify you by phone as soon as possible.  Submit refill requests through [employer] or call your pharmacy and they will forward the refill request to us. Please allow 3 business days for your refill to be completed.          Additional Information About Your Visit        [email or web address] Information     [employer] lets you send messages to your doctor, view your test results, renew your prescriptions, schedule appointments and more. To sign up, go to [email or web address] . Click on \"Log in\" on the left side of the screen, which will take you to the Welcome page. Then click on \"Sign up Now\" on the right side of the page.     You will be asked to enter the access code listed below, as well as some personal information. Please follow the directions to create your username and password.     Your access code is: [email or web address]  Expires: 2018 12:12 AM     Your access code will  in 90 days. If you need help or a new code, please call your [facility] clinic or [phone].        Care EveryWhere ID     This is your Care EveryWhere ID. This could be used by other organizations to access your [facility] medical records  [id number]        Your Vitals Were     Pulse " Temperature Respirations Last Period Pulse Oximetry BMI (Body Mass Index)    105 98.4  F (36.9  C) (Oral) 16 01/11/2018 (Approximate) 99% 46.3 kg/m2       Blood Pressure from Last 3 Encounters:   01/22/18 132/70   01/21/18 (!) 157/98   01/16/18 120/76    Weight from Last 3 Encounters:   01/22/18 (!) 332 lb (150.6 kg)   01/21/18 (!) 332 lb (150.6 kg)   01/16/18 (!) 333 lb (151 kg)              We Performed the Following     [facility] PT, HAND, AND CHIROPRACTIC REFERRAL          Where to get your medicines      Some of these will need a paper prescription and others can be bought over the counter.  Ask your nurse if you have questions.     Bring a paper prescription for each of these medications     oxyCODONE IR 5 MG tablet          Primary Care Provider Fax #    Physician No Ref-Primary [phone]       No address on file        Equal Access to Services     GALE NASCIMENTO : Kerline Vo, wasonidoda shila, qacathleenta kaalmada amy, chandra wiggins . So [facility] [phone].    ATENCIÓN: Si gissell salmeron, tiene a oscar disposición servicios gratuitos de asistencia lingüística. Llame al [phone].    We comply with applicable federal civil rights laws and [place] laws. We do not discriminate on the basis of race, color, national origin, age, disability, sex, sexual orientation, or gender identity.            Thank you!     Thank you for choosing [facility]  for your care. Our goal is always to provide you with excellent care. Hearing back from our patients is one way we can continue to improve our services. Please take a few minutes to complete the written survey that you may receive in the mail after your visit with us. Thank you!             Your Updated Medication List - Protect others around you: Learn how to safely use, store and throw away your medicines at www.disposemymeds.org.          This list is accurate as of: 1/22/18  1:34 PM.  Always use your most recent  med list.                   Brand Name Dispense Instructions for use Diagnosis    cyclobenzaprine 10 MG tablet    FLEXERIL    30 tablet    Take 1 tablet (10 mg) by mouth 2 times daily as needed for muscle spasms    Back strain, subsequent encounter       IBUPROFEN PO      Take 800 mg by mouth        naproxen 500 MG tablet    NAPROSYN    60 tablet    Take 1 tablet (500 mg) by mouth 2 times daily as needed for moderate pain    Back strain, subsequent encounter       oxyCODONE IR 5 MG tablet    ROXICODONE    50 tablet    Take 1 tablet (5 mg) by mouth every 6 hours as needed for pain    Acute bilateral low back pain without sciatica       TYLENOL PO      Take 1,000 mg by mouth

## 2018-01-22 NOTE — DISCHARGE INSTRUCTIONS
Understanding Lumbosacral Strain    Lumbosacral strain is a medical term for an injury that causes low back pain. The lumbosacral area (low back) is between the bottom of the ribcage and the top of the buttocks. A strain is tearing of muscles and tendons. These tears can be very small but still cause pain.  How a lumbosacral strain happens  Muscles and tendons connected to the spine can be strained in a number of ways:    Sitting or standing in the same position for long periods of time. This can harm the low back over time. Poor posture can make low back pain more likely.    Moving the muscles and tendons past their usual range of motion. This can cause a sudden injury. This can happen when you twist, bend over, or lift something heavy. Not using correct technique for sports or tasks like lifting can make back injury more likely.    Accidents or falls  Lumbosacral strain can be caused by other problems, but these are less common.  Symptoms of lumbosacral strain  Symptoms may include:    Pain in the back, often on one side    Pain that gets worse with movement and gets better with rest    Inability to move as freely as usual    Swelling, slight redness, and skin warmth in the painful area  Treatment for lumbosacral strain  Low back pain often goes away by itself within several weeks. But it often comes back. Treatment focuses on reducing pain and avoiding further injury. Bed rest is usually not recommended for low back pain. Treatments may include:    Avoiding or changing the action that caused the problem. This helps prevent injuring the tissues again.    Prescription or over-the-counter pain medicines. These help reduce inflammation, swelling, and pain.    Cold or heat packs. These help reduce pain and swelling.    Stretching and other exercises. These improve flexibility and strength.    Physical therapy. This usually includes exercises and other treatments.    Injections of medicine. This may relieve  symptoms.  If these treatments do not relieve symptoms, your healthcare provider may order imaging tests to learn more about the problem. Sometimes you may need surgery.  Possible complications of lumbosacral strain  If the cause of the pain is not addressed, symptoms may return or get worse. Follow your healthcare provider s instructions on lifestyle changes and treating your back.     When to call your healthcare provider  Call your healthcare provider right away if you have any of these:    Fever of 100.4 F (38 C) or higher, or as directed    Numbness, tingling, or weakness    Problems with bowel or bladder control, or problems having sex    Pain that does not go away, or gets worse    New symptoms

## 2018-01-22 NOTE — NURSING NOTE
"Chief Complaint   Patient presents with     Work Comp       Initial /70 (BP Location: Left arm, Patient Position: Chair, Cuff Size: Adult Large)  Pulse 105  Temp 98.4  F (36.9  C) (Oral)  Resp 16  Wt (!) 332 lb (150.6 kg)  LMP 01/11/2018 (Approximate)  SpO2 99%  BMI 46.3 kg/m2 Estimated body mass index is 46.3 kg/(m^2) as calculated from the following:    Height as of 1/16/18: 5' 11\" (1.803 m).    Weight as of this encounter: 332 lb (150.6 kg).  Medication Reconciliation: complete  "

## 2018-01-22 NOTE — PROGRESS NOTES
SUBJECTIVE:                                                    Linette Rosa is a 22 year old female who presents to clinic today for the following health issues:      HPI      ED/UC Followup:    Facility:  [facility]   Date of visit: 1/9/18 (date of work injury) & 1/21/18  Reason for visit:  Back pain   Current Status: Same     - Gave 2 more weeks of limited work   - No sciatic pain, only right leg goes numb when driving    - Work comp - nursing assistant at [employer]   - Also saw [facility] provider on 1/16/18   - Pain 7/10 today     Pt was told to follow up and see about PT referral  Pt also states the flexeril 10 mg is not helping with the pain       Uses at night when goes to bed        Can't take during the day, makes sleepy   Has not taken the oxycodone yet  Still doing Naproxen twice a day                          ED visit 1/9/18   - Given Naprosyn 500 mg BID, ice and heat, 10 lbs lifting restriction     Visit with [facility] provider on  1/16/18  - Continue Naproxen or Tylenol, given Flexeril and Prednisone, work restrictions increased to 40 lbs     ED visit 1/21/18  - Worsening pain   - Give IM toradol and oral Valium, no change, given IM Dilaudid, no change   - Then given PO oxycodone, which helped      Given #8 to go home with   - Recommend MRI if no improvement   - Lifting restrictions changed to 10 lbs       Problem list and histories reviewed & adjusted, as indicated.  Additional history: as documented    ROS:  Constitutional, HEENT, cardiovascular, pulmonary, gi and gu systems are negative, except as otherwise noted.      OBJECTIVE:   /70 (BP Location: Left arm, Patient Position: Chair, Cuff Size: Adult Large)  Pulse 105  Temp 98.4  F (36.9  C) (Oral)  Resp 16  Wt (!) 332 lb (150.6 kg)  LMP 01/11/2018 (Approximate)  SpO2 99%  BMI 46.3 kg/m2  Body mass index is 46.3 kg/(m^2).  GENERAL APPEARANCE: healthy, alert and no distress  EYES: Eyes grossly normal to inspection, PERRLA,  conjunctivae and sclerae without injection or discharge, EOM intact   MS: No musculoskeletal defects are noted and gait is age appropriate without ataxia   SKIN: No suspicious lesions or rashes, hydration status appears adeuqate with normal skin turgor   NEURO: Strength 5+ bilateral lower extremities, sensation intact in distal bilateral  lower extremities, mentation- intact, speech- normal, reflexes- symmetric in bilateral lower extremities  BACK: No CVA tenderness, bilateral paralumbar tenderness with spasm, no midline tenderness, decreased ROM with twisting and flexion   PSYCH: Alert and oriented x3; speech- coherent , normal rate and volume; able to articulate logical thoughts, able to abstract reason, no tangential thoughts, no hallucinations or delusions, mentation appears normal, Mood is euthymic. Affect is appropriate for this mood state and bright. Thought content is free of suicidal ideation, hallucinations, and delusions. Dress is adequate and upkept. Eye contact is good during conversation.       Diagnostic Test Results:  none     ASSESSMENT/PLAN:       ICD-10-CM    1. Acute bilateral low back pain without sciatica M54.5 [facility] PT, HAND, AND CHIROPRACTIC REFERRAL     oxyCODONE IR (ROXICODONE) 5 MG tablet   2. Morbid obesity due to excess calories (H) E66.01    3. Morbid obesity with BMI of 40.0-44.9, adult (H) E66.01     Z68.41      - Agree with previous assessments, seems to be more muscular   - No imaging was done, discussed will start physical therapy and if symptoms persist then will get MRI   - Weight loss recommended   -  reviewed, only fill was from ED provider as above   - Continue with light duty at work for 2 weeks, will reassess at each appointment   - Continue twice daily Naproxen 500 mg, reviewed use and side effects  - Continue with 1 tablet of Flexeril at night, reviewed use and side effects   - Trial of Oxycodone 5 mg, 3-4/day, try to keep as low as possible      Reviewed use and side  effects including sedation and addiction, no driving on this medication   - Gave hand out on 3 most important back stretches, want patient to work up to 2-3x/day by our next appointment (unless instructed differently by physical therapy)   - Recheck every 2 weeks     The patient indicates understanding of these issues and agrees with the plan.    Follow up: 2 weeks as as above            Lorraine Hopkins PA-C  [facility]

## 2018-01-22 NOTE — PATIENT INSTRUCTIONS
- Continue twice daily Naproxen   - Continue with 1 tablet of Flexeril at night   - Trial of Oxycodone 5 mg, 3-4/day   - Call for appointment with physical therapy  - Recheck every 2 weeks         Low Back Pain Exercises                                            Cat and camel: Get down on your hands and knees. Let your stomach sag, allowing your back to curve downward. Hold this position for 5 seconds. Then arch your back and hold for 5 seconds. Do 3 sets of 10.       Pelvic tilt: Lie on your back with your knees bent and your feet flat on the floor. Tighten your abdominal muscles and push your lower back into the floor. Hold this position for 5 seconds, then relax. Do 3 sets of 10.       Extension exercise: Lie face down on the floor for 5 minutes. If this hurts too much, lie face down with a pillow under your stomach. This should relieve your leg or back pain. When you can lie on your stomach for 5 minutes without a pillow, then you can continue with the rest of this exercise.     After lying on your stomach for 5 minutes, prop yourself up on your elbows for another 5 minutes. Lie flat again for 1 minute, then press down on your hands and extend your elbows while keeping your hips flat on the floor. Hold for 1 second and lower yourself to the floor. Repeat 10 times. Do 4 sets. Rest for 2 minutes between sets. You should have no pain in your legs when you do this, but it is normal to feel pain in your lower back. Do this several times a day.          Developed by [employer]   Published by [employer].   Last modified: 2009-02-08   Last reviewed: 2008-07-07   This content is reviewed periodically and is subject to change as new health information becomes available. The information is intended to inform and educate and is not a replacement for medical evaluation, advice, diagnosis or treatment by a healthcare professional.   Adult Health Advisor 2009.1 Index  Adult Health Advisor 2009.1 Credits     2009 [facility]  and/or its affiliates. All Rights Reserved.

## 2018-01-23 ENCOUNTER — THERAPY VISIT (OUTPATIENT)
Dept: PHYSICAL THERAPY | Facility: CLINIC | Age: 23
End: 2018-01-23
Payer: OTHER MISCELLANEOUS

## 2018-01-23 DIAGNOSIS — M54.50 ACUTE BILATERAL LOW BACK PAIN WITHOUT SCIATICA: Primary | ICD-10-CM

## 2018-01-23 PROCEDURE — 97161 PT EVAL LOW COMPLEX 20 MIN: CPT | Mod: GP | Performed by: PHYSICAL THERAPIST

## 2018-01-23 PROCEDURE — 97140 MANUAL THERAPY 1/> REGIONS: CPT | Mod: GP | Performed by: PHYSICAL THERAPIST

## 2018-01-23 PROCEDURE — 97110 THERAPEUTIC EXERCISES: CPT | Mod: GP | Performed by: PHYSICAL THERAPIST

## 2018-01-23 NOTE — LETTER
[facility] ATHLETIC [facility] PHYSICAL THERAPY  [address]  [facility] [id number] [phone]    2018    Re: Linette Rosa   :   1995  MRN:  [id number]   REFERRING PHYSICIAN:   Lorraine Cho-*    [facility]    Date of Initial Evaluation:  ***  Visits:  Rxs Used: 1  Reason for Referral:  Acute bilateral low back pain without sciatica    EVALUATION SUMMARY    [facility] Initial Evaluation  Subjective:  Patient is a 22 year old female presenting with rehab back hpi. The history is provided by the patient. No  was used.   Linette Rosa is a 22 year old female with a lumbar condition.  Condition occurred with:  Lifting.  Condition occurred: at work.  This is a new condition  Pt presents to PT with primary complaint of low back pain, states she injured her back at work 2 weeks ago. 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 Pain is located in the low back and R side, into the RLE with sitting and driving. Pain rating at 8/10 level. Pt followed up with physician on 18 and was referred to PT for evaluate and treat. Pain is rated at 8/10 level. .    Patient reports pain:  Lower lumbar spine, central lumbar spine, lumbar spine right, SI joint right, lower thoracic spine and thoracic spine right.  Radiates to:  Gluteals right.  Pain is described as aching, sharp and shooting and is constant and reported as 8/10.  Associated symptoms:  Loss of motion/stiffness and loss of strength. Pain is the same all the time.  Symptoms are exacerbated by bending, lifting, sitting and standing and relieved by nothing.  Since onset symptoms are gradually worsening.        General health as reported by patient is fair.  Pertinent medical history includes:  Overweight, high blood pressure, depression, anemia and smoking.  " Medical allergies: no.  Other surgeries include:  Orthopedic surgery (wrist).  Current medications:  Sleep medication, anti-inflammatory, pain medication and muscle relaxants.  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  Patient is working in normal job with restrictions.  Primary job tasks include:  Prolonged standing, lifting and repetitive tasks.    Barriers include:  None as reported by the patient.    Red flags:  Pain at rest/night.                        Objective:  System         Lumbar/SI Evaluation  ROM:    AROM Lumbar:   Flexion:          4\" above knees pain low back   Ext:                    25 deg, painful low back, worse than with flexion    Side Bend:        Left:  25 deg, no change    Right:  25 deg, painful low back   Rotation:           Left:     Right:   Side Glide:        Left:     Right:           Lumbar Myotomes:  normal                Lumbar Dermtomes:  normal                Neural Tension/Mobility:  Neural tension wnl lumbar: Lateral thigh pain with slump testing and SLR to knee       Left side:SLR or Slump  negative.   Right side:   Slump and SLR positive.  Lumbar Palpation:    Tenderness present at Left:    Piriformis; PSIS and Vertebral  Tenderness not present at Left:    Quadratus Lumborum; Erector Spinae; ASIS; Iliac Crest; Gluteus Medius; Greater Trochanter or Hip Flexors  Tenderness present at Right: Quadratus Lumborum; Erector Spinae; Piriformis; PSIS; Greater Trochanter and Vertebral  Tenderness not present at Right:  ASIS; Iliac Crest; Gluteus Medius or Hip flexors      Spinal Segmental Conclusions:     Level: FRS right noted at L2, L3 and L4  Level:  FRS left noted at T10 and T11    SI joint/Sacrum:    Level iliac crest, level PSIS, (-) standing flexion test, (-) sacral flexion and extension tests, (-) shear test                                                         General     ROS    Assessment/Plan:    Patient is a 22 year old female with lumbar complaints.  "   Patient has the following significant findings with corresponding treatment plan.                Diagnosis 1:  Low back pain   Pain -  hot/cold therapy, US, electric stimulation, manual therapy, self management, education, directional preference exercise and home program  Decreased ROM/flexibility - manual therapy, therapeutic exercise and home program  Inflammation - cold therapy, US, electric stimulation and self management/home program  Impaired muscle performance - neuro re-education and home program  Decreased function - therapeutic activities and home program  Impaired posture - neuro re-education and home program    Therapy Evaluation Codes:   1) History comprised of:   Personal factors that impact the plan of care:      Profession.    Comorbidity factors that impact the plan of care are:      Depression and Overweight.     Medications impacting care: None.  2) Examination of Body Systems comprised of:   Body structures and functions that impact the plan of care:      Lumbar spine and Thoracic Spine.   Activity limitations that impact the plan of care are:      Bending, Lifting, Sitting, Standing, Walking, Working, Sleeping and Laying down.  3) Clinical presentation characteristics are:   Stable/Uncomplicated.  4) Decision-Making    Low complexity using standardized patient assessment instrument and/or measureable assessment of functional outcome.  Cumulative Therapy Evaluation is: Low complexity.    Previous and current functional limitations:  (See Goal Flow Sheet for this information)    Short term and Long term goals: (See Goal Flow Sheet for this information)     Communication ability:  Patient appears to be able to clearly communicate and understand verbal and written communication and follow directions correctly.  Treatment Explanation - The following has been discussed with the patient:   RX ordered/plan of care  Anticipated outcomes  Possible risks and side effects  This patient would benefit from  PT intervention to resume normal activities.   Rehab potential is good.    Frequency:  2 X week, once daily  Duration:  for 2 weeks tapering to 1 X a week over 6 weeks  Discharge Plan:  Achieve all LTG.  Independent in home treatment program.  Reach maximal therapeutic benefit.    Please refer to the daily flowsheet for treatment today, total treatment time and time spent performing 1:1 timed codes.         Thank you for your referral.    INQUIRIES  Therapist:   [facility]  [address]  [facility] [address]  Phone: [phone]  Fax: [phone]

## 2018-01-23 NOTE — MR AVS SNAPSHOT
"              After Visit Summary   1/23/2018    Linette Rosa    MRN: [id number]           Patient Information     Date Of Birth          1995        Visit Information        Provider Department      1/23/2018 10:20 AM Ar Padgett PT [facility] Athletic [facility]        Today's Diagnoses     Acute bilateral low back pain without sciatica    -  1       Follow-ups after your visit        Your next 10 appointments already scheduled     Feb 02, 2018  9:00 AM CST   Office Visit with Lorraine Hopkins PA-C   [facility] ([facility]    [address] [place] [address] [address]  [facility] [id number]   [phone]           Bring a current list of meds and any records pertaining to this visit. For Physicals, please bring immunization records and any forms needing to be filled out. Please arrive 10 minutes early to complete paperwork.              Who to contact     If you have questions or need follow up information about today's clinic visit or your schedule please contact [facility] ATHLETIC [facility] PHYSICAL [facility] directly at [phone].  Normal or non-critical lab and imaging results will be communicated to you by [employer], letter or phone within 4 business days after the clinic has received the results. If you do not hear from us within 7 days, please contact the clinic through [employer] or phone. If you have a critical or abnormal lab result, we will notify you by phone as soon as possible.  Submit refill requests through [employer] or call your pharmacy and they will forward the refill request to us. Please allow 3 business days for your refill to be completed.          Additional Information About Your Visit        [employer] Information     [employer] lets you send messages to your doctor, view your test results, renew your prescriptions, schedule appointments and more. To sign up, go to [email or web address] . Click on \"Log in\" on the " "left side of the screen, which will take you to the Welcome page. Then click on \"Sign up Now\" on the right side of the page.     You will be asked to enter the access code listed below, as well as some personal information. Please follow the directions to create your username and password.     Your access code is: [email or web address]  Expires: 2018 12:12 AM     Your access code will  in 90 days. If you need help or a new code, please call your [place] clinic or [phone].        Care EveryWhere ID     This is your Care EveryWhere ID. This could be used by other organizations to access your [place] medical records  [id number]        Your Vitals Were     Last Period                   2018 (Approximate)            Blood Pressure from Last 3 Encounters:   18 132/70   18 (!) 157/98   18 120/76    Weight from Last 3 Encounters:   18 (!) 150.6 kg (332 lb)   18 (!) 150.6 kg (332 lb)   18 (!) 151 kg (333 lb)              We Performed the Following     HC PT EVAL, LOW COMPLEXITY     MELISSA INITIAL EVAL REPORT     MANUAL THER TECH,1+REGIONS,EA 15 MIN     THERAPEUTIC EXERCISES        Primary Care Provider Fax #    Physician No Ref-Primary [phone]       No address on file        Equal Access to Services     GALE NASCIMENTO : Hadii tonja swansono Somarilyn, waaxda luqadaha, qaybta kaalmada amy, chandra wiggins . So [facility] [phone].    ATENCIÓN: Si habla español, tiene a oscar disposición servicios gratuitos de asistencia lingüística. Llame al [phone].    We comply with applicable federal civil rights laws and [place] laws. We do not discriminate on the basis of race, color, national origin, age, disability, sex, sexual orientation, or gender identity.            Thank you!     Thank you for choosing [facility] PH[facility]ICAL THERAP[facility]  for your care. Our goal is always to provide you with excellent care. Hearing back " from our patients is one way we can continue to improve our services. Please take a few minutes to complete the written survey that you may receive in the mail after your visit with us. Thank you!             Your Updated Medication List - Protect others around you: Learn how to safely use, store and throw away your medicines at www.disposemymeds.org.          This list is accurate as of: 1/23/18 11:09 AM.  Always use your most recent med list.                   Brand Name Dispense Instructions for use Diagnosis    cyclobenzaprine 10 MG tablet    FLEXERIL    30 tablet    Take 1 tablet (10 mg) by mouth 2 times daily as needed for muscle spasms    Back strain, subsequent encounter       IBUPROFEN PO      Take 800 mg by mouth        naproxen 500 MG tablet    NAPROSYN    60 tablet    Take 1 tablet (500 mg) by mouth 2 times daily as needed for moderate pain    Back strain, subsequent encounter       oxyCODONE IR 5 MG tablet    ROXICODONE    50 tablet    Take 1 tablet (5 mg) by mouth every 6 hours as needed for pain    Acute bilateral low back pain without sciatica       TYLENOL PO      Take 1,000 mg by mouth

## 2018-01-23 NOTE — PROGRESS NOTES
[place] [facility] Initial Evaluation  Subjective:  Patient is a 22 year old female presenting with rehab back hpi. The history is provided by the patient. No  was used.   Linette Rosa is a 22 year old female with a lumbar condition.  Condition occurred with:  Lifting.  Condition occurred: at work.  This is a new condition  Pt presents to PT with primary complaint of low back pain, states she injured her back at work 2 weeks ago. 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 Pain is located in the low back and R side, into the RLE with sitting and driving. Pain rating at 8/10 level. Pt followed up with physician on 1-22-18 and was referred to PT for evaluate and treat. Pain is rated at 8/10 level. .    Patient reports pain:  Lower lumbar spine, central lumbar spine, lumbar spine right, SI joint right, lower thoracic spine and thoracic spine right.  Radiates to:  Gluteals right.  Pain is described as aching, sharp and shooting and is constant and reported as 8/10.  Associated symptoms:  Loss of motion/stiffness and loss of strength. Pain is the same all the time.  Symptoms are exacerbated by bending, lifting, sitting and standing and relieved by nothing.  Since onset symptoms are gradually worsening.        General health as reported by patient is fair.  Pertinent medical history includes:  Overweight, high blood pressure, depression, anemia and smoking.  Medical allergies: no.  Other surgeries include:  Orthopedic surgery (wrist).  Current medications:  Sleep medication, anti-inflammatory, pain medication and muscle relaxants.  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  Patient is working in normal job with restrictions.  Primary job tasks include:  Prolonged standing, lifting and repetitive tasks.    Barriers include:  None as reported by the patient.    Red flags:  Pain at rest/night.                 "        Objective:  System         Lumbar/SI Evaluation  ROM:    AROM Lumbar:   Flexion:          4\" above knees pain low back   Ext:                    25 deg, painful low back, worse than with flexion    Side Bend:        Left:  25 deg, no change    Right:  25 deg, painful low back   Rotation:           Left:     Right:   Side Glide:        Left:     Right:           Lumbar Myotomes:  normal                Lumbar Dermtomes:  normal                Neural Tension/Mobility:  Neural tension wnl lumbar: Lateral thigh pain with slump testing and SLR to knee       Left side:SLR or Slump  negative.   Right side:   Slump and SLR positive.  Lumbar Palpation:    Tenderness present at Left:    Piriformis; PSIS and Vertebral  Tenderness not present at Left:    Quadratus Lumborum; Erector Spinae; ASIS; Iliac Crest; Gluteus Medius; Greater Trochanter or Hip Flexors  Tenderness present at Right: Quadratus Lumborum; Erector Spinae; Piriformis; PSIS; Greater Trochanter and Vertebral  Tenderness not present at Right:  ASIS; Iliac Crest; Gluteus Medius or Hip flexors      Spinal Segmental Conclusions:     Level: FRS right noted at L2, L3 and L4  Level:  FRS left noted at T10 and T11    SI joint/Sacrum:    Level iliac crest, level PSIS, (-) standing flexion test, (-) sacral flexion and extension tests, (-) shear test                                                         General     ROS    Assessment/Plan:    Patient is a 22 year old female with lumbar complaints.    Patient has the following significant findings with corresponding treatment plan.                Diagnosis 1:  Low back pain   Pain -  hot/cold therapy, US, electric stimulation, manual therapy, self management, education, directional preference exercise and home program  Decreased ROM/flexibility - manual therapy, therapeutic exercise and home program  Inflammation - cold therapy, US, electric stimulation and self management/home program  Impaired muscle performance - " neuro re-education and home program  Decreased function - therapeutic activities and home program  Impaired posture - neuro re-education and home program    Therapy Evaluation Codes:   1) History comprised of:   Personal factors that impact the plan of care:      Profession.    Comorbidity factors that impact the plan of care are:      Depression and Overweight.     Medications impacting care: None.  2) Examination of Body Systems comprised of:   Body structures and functions that impact the plan of care:      Lumbar spine and Thoracic Spine.   Activity limitations that impact the plan of care are:      Bending, Lifting, Sitting, Standing, Walking, Working, Sleeping and Laying down.  3) Clinical presentation characteristics are:   Stable/Uncomplicated.  4) Decision-Making    Low complexity using standardized patient assessment instrument and/or measureable assessment of functional outcome.  Cumulative Therapy Evaluation is: Low complexity.    Previous and current functional limitations:  (See Goal Flow Sheet for this information)    Short term and Long term goals: (See Goal Flow Sheet for this information)     Communication ability:  Patient appears to be able to clearly communicate and understand verbal and written communication and follow directions correctly.  Treatment Explanation - The following has been discussed with the patient:   RX ordered/plan of care  Anticipated outcomes  Possible risks and side effects  This patient would benefit from PT intervention to resume normal activities.   Rehab potential is good.    Frequency:  2 X week, once daily  Duration:  for 2 weeks tapering to 1 X a week over 6 weeks  Discharge Plan:  Achieve all LTG.  Independent in home treatment program.  Reach maximal therapeutic benefit.    Please refer to the daily flowsheet for treatment today, total treatment time and time spent performing 1:1 timed codes.

## 2018-01-25 NOTE — PROGRESS NOTES
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    Description:   Location of pain: middle of back both  Character of pain: sharp, dull ache and constant  Pain radiation:radiates into the right buttocks and radiates into the right leg  New numbness or weakness in legs, not attributed to pain:  no     Intensity: Currently 8/10    History:   Pain interferes with job: YES  History of back problems: no prior back problems  Any previous MRI or X-rays: None  Sees a specialist for back pain:  No  Therapies tried without relief: Physical Therapy    Alleviating factors:   Improved by: nothing       Precipitating factors:  Worsened by: any activity for to long    Functional and Psychosocial Screen (Cholo STarT Back):                       7 and Very Much       - Physical therapy, trying everything and nothing is helping   - Naproxen twice a day  - Oxycodone 1-2 per day, mostly at night  (like 6 left)   - Flexeril only once a day at bedtime   - Light duty at work   - When at home takes Oxycodone and Flexeril, still painful       Plan from last visit on 1/22/18  - Agree with previous assessments, seems to be more muscular   - No imaging was done, discussed will start physical therapy and if symptoms persist then will get MRI   - Weight loss recommended   -  reviewed, only fill was from ED provider as above   - Continue with light duty at work for 2 weeks, will reassess at each appointment   - Continue twice daily Naproxen 500 mg, reviewed use and side effects  - Continue with 1 tablet of Flexeril at night, reviewed use and side effects   - Trial of Oxycodone 5 mg, 3-4/day, try to keep as low as possible      Reviewed use and side effects including sedation and addiction, no driving on this medication   - Gave hand out on 3 most important back stretches, want  patient to work up to 2-3x/day by our next appointment (unless instructed differently by physical therapy)   - Recheck every 2 weeks           Problem list and histories reviewed & adjusted, as indicated.  Additional history: as documented      ROS:  Constitutional, HEENT, cardiovascular, pulmonary, gi and gu systems are negative, except as otherwise noted.    OBJECTIVE:   /78  Pulse 100  Temp 97.8  F (36.6  C) (Oral)  Resp 18  Wt (!) 327 lb (148.3 kg)  LMP 01/11/2018 (Approximate)  SpO2 98%  BMI 45.61 kg/m2  Body mass index is 45.61 kg/(m^2).  GENERAL APPEARANCE: healthy, alert and no distress  EYES: Eyes grossly normal to inspection, PERRLA, conjunctivae and sclerae without injection or discharge, EOM intact   MS: No musculoskeletal defects are noted and gait is age appropriate without ataxia   SKIN: No suspicious lesions or rashes, hydration status appears adeuqate with normal skin turgor   NEURO: Declined by patient due to pain   BACK: No CVA tenderness, bilateral paralumbar tenderness, no midline tenderness, decreased ROM   PSYCH: Alert and oriented x3; speech- coherent , normal rate and volume; able to articulate logical thoughts, able to abstract reason, no tangential thoughts, no hallucinations or delusions, mentation appears normal, Mood is euthymic. Affect is appropriate for this mood state and bright. Thought content is free of suicidal ideation, hallucinations, and delusions. Dress is adequate and upkept. Eye contact is good during conversation.       Diagnostic Test Results:  none     ASSESSMENT/PLAN:       ICD-10-CM    1. Acute bilateral low back pain without sciatica M54.5 MR Lumbar Spine w/o Contrast     NEUROSURGERY REFERRAL     oxyCODONE IR (ROXICODONE) 5 MG tablet     methocarbamol (ROBAXIN) 750 MG tablet   2. Back strain, subsequent encounter S39.012D naproxen (NAPROSYN) 500 MG tablet     methocarbamol (ROBAXIN) 750 MG tablet     DISCONTINUED: cyclobenzaprine (FLEXERIL) 10 MG tablet    3. Morbid obesity with BMI of 40.0-44.9, adult (H) E66.01     Z68.41      - Work injury from 1/8/18 ymptoms persist then will get MRI   - No imaging was done, physical therapy not helping, conservative therapy not help, symptoms persist will get MRI and consult with neurosurgery   - Weight loss recommended   -  reviewed, only fill was from ED provider as above   - Continue with light duty at work for 3 weeks, will reassess at each appointment     Form completed and given to patient, also scanned into chart    - Continue twice daily Naproxen 500 mg, reviewed use and side effects  - Flexeril was not helping      Will stop and switch to Robaxin, discussed use and side effects   - Trial of Oxycodone 5 mg, 3-4/day, try to keep as low as possible      Reviewed use and side effects including sedation and addiction, no driving on this medication  - Recheck every 3 weeks     The patient indicates understanding of these issues and agrees with the plan.    Follow up: as outlined above       Lorraine Hopkins PA-C  [facility]

## 2018-01-30 ENCOUNTER — THERAPY VISIT (OUTPATIENT)
Dept: PHYSICAL THERAPY | Facility: CLINIC | Age: 23
End: 2018-01-30
Payer: OTHER MISCELLANEOUS

## 2018-01-30 DIAGNOSIS — M54.50 ACUTE BILATERAL LOW BACK PAIN WITHOUT SCIATICA: ICD-10-CM

## 2018-01-30 PROCEDURE — 97110 THERAPEUTIC EXERCISES: CPT | Mod: GP | Performed by: PHYSICAL THERAPIST

## 2018-01-30 PROCEDURE — 97140 MANUAL THERAPY 1/> REGIONS: CPT | Mod: GP | Performed by: PHYSICAL THERAPIST

## 2018-02-01 ENCOUNTER — THERAPY VISIT (OUTPATIENT)
Dept: PHYSICAL THERAPY | Facility: CLINIC | Age: 23
End: 2018-02-01
Payer: OTHER MISCELLANEOUS

## 2018-02-01 DIAGNOSIS — M54.50 ACUTE BILATERAL LOW BACK PAIN WITHOUT SCIATICA: ICD-10-CM

## 2018-02-01 PROCEDURE — 97110 THERAPEUTIC EXERCISES: CPT | Mod: GP | Performed by: PHYSICAL THERAPIST

## 2018-02-01 NOTE — PROGRESS NOTES
"Subjective:  HPI                    Objective:  System    Physical Exam    General     ROS    Assessment/Plan:    PROGRESS  REPORT    Progress reporting period is from 1-23-18 to 2-1-18.       SUBJECTIVE  Subjective: Pt reports her back feels the same, no change in the pain. States she \"could barely move\" this a. Pain today located central lumbar, mid thoracic spine. Pain rating remains at 8/10 level. Remains unable to sleep at night, states losing 3-5 hours a night.     Current pain level is 8/10 Current Pain level: 8/10.     Previous pain level was  8/10 Initial Pain level: 8/10.   Changes in function:  None  Adverse reaction to treatment or activity: None    OBJECTIVE  Changes noted in objective findings:    Objective: No change felt following the work on the SI joint. SI joint re eval demonstrating level landmarks and symmetrical motion. (-) prone instability test. Continues with tigling RLE with driving. Trunk flexion to lower tibia, no increased pain. Extenson 25 deg, with increased LBP. No FRS or ERS noted in lumbar or thoracic spine.      ASSESSMENT/PLAN  Updated problem list and treatment plan: Diagnosis 1:  Low back pain   Pain -  hot/cold therapy, electric stimulation, manual therapy, self management, education, directional preference exercise and home program  Decreased ROM/flexibility - manual therapy, therapeutic exercise and home program  Inflammation - US, electric stimulation and self management/home program  Impaired muscle performance - neuro re-education and home program  Decreased function - therapeutic activities and home program  Impaired posture - neuro re-education and home program  STG/LTGs have been met or progress has been made towards goals:  None  Assessment of Progress: The patient's condition is unchanged.  Self Management Plans:  Patient has been instructed in a home treatment program.  Patient  has been instructed in self management of symptoms.    Linette continues to require the " following intervention to meet STG and LTG's:  PT    Recommendations:  This patient would benefit from continued therapy.     Frequency:  2 X week, once daily  Duration:  for 1 weeks decreasing to 1x/week for 5 weeks       This patient would benefit from further evaluation.    Please refer to the daily flowsheet for treatment today, total treatment time and time spent performing 1:1 timed codes.

## 2018-02-01 NOTE — MR AVS SNAPSHOT
After Visit Summary   2/1/2018    Linette Rosa    MRN: [id number]           Patient Information     Date Of Birth          1995        Visit Information        Provider Department      2/1/2018 9:00 AM Ar Padgett PT [facility] Physical Therapy        Today's Diagnoses     Acute bilateral low back pain without sciatica           Follow-ups after your visit        Your next 10 appointments already scheduled     Feb 02, 2018  9:00 AM CST   Office Visit with Lorraine Hopkins PA-C   [facility] ([facility]    [address] [place] [address] [address]  [facility] [address]   [phone]           Bring a current list of meds and any records pertaining to this visit. For Physicals, please bring immunization records and any forms needing to be filled out. Please arrive 10 minutes early to complete paperwork.            Feb 05, 2018 10:00 AM CST   MELISSA Spine with Ar Padgett PT   [facility] ([facility]  )    [address]  [facility] [address]   [phone]            Feb 08, 2018  9:40 AM CST   MELISSA Spine with Ar Padgett PT   [facility] ([facility]  )    [address]  [facility] [address]   [phone]              Who to contact     If you have questions or need follow up information about today's clinic visit or your schedule please contact [facility] PHYSICAL THERAPY directly at [phone].  Normal or non-critical lab and imaging results will be communicated to you by MyChart, letter or phone within 4 business days after the clinic has received the results. If you do not hear from us within 7 days, please contact the clinic through MyChart or phone. If you have a critical or abnormal lab result, we will notify you by phone as soon as possible.  Submit refill  "requests through Net[employer] or call your pharmacy and they will forward the refill request to us. Please allow 3 business days for your refill to be completed.          Additional Information About Your Visit        Looop Online[employer] Information     Netz[employer]iker lets you send messages to your doctor, view your test results, renew your prescriptions, schedule appointments and more. To sign up, go to [email or web address] . Click on \"Log in\" on the left side of the screen, which will take you to the Welcome page. Then click on \"Sign up Now\" on the right side of the page.     You will be asked to enter the access code listed below, as well as some personal information. Please follow the directions to create your username and password.     Your access code is: [email or web address]  Expires: 2018  9:44 AM     Your access code will  in 90 days. If you need help or a new code, please call your [facility] clinic or [phone].        Care EveryWhere ID     This is your Care EveryWhere ID. This could be used by other organizations to access your [facility] medical records  [id number]        Your Vitals Were     Last Period                   2018 (Approximate)            Blood Pressure from Last 3 Encounters:   18 132/70   18 (!) 157/98   18 120/76    Weight from Last 3 Encounters:   18 (!) 150.6 kg (332 lb)   18 (!) 150.6 kg (332 lb)   18 (!) 151 kg (333 lb)              We Performed the Following     THERAPEUTIC EXERCISES        Primary Care Provider Fax #    Physician No Ref-Primary [phone]       No address on file        Equal Access to Services     MIGUEL NASCIMENTO : Hadii tonja Vo, wamonique fuchs, qaybsonja kaalchandra rachel. So [facility] [phone].    ATENCIÓN: Si habla español, tiene a oscar disposición servicios gratuitos de asistencia lingüística. Nury li [phone].    We comply with applicable federal civil rights laws and " [place] laws. We do not discriminate on the basis of race, color, national origin, age, disability, sex, sexual orientation, or gender identity.            Thank you!     Thank you for choosing [place] [facility]IC [facility] PHYSICAL [facility]  for your care. Our goal is always to provide you with excellent care. Hearing back from our patients is one way we can continue to improve our services. Please take a few minutes to complete the written survey that you may receive in the mail after your visit with us. Thank you!             Your Updated Medication List - Protect others around you: Learn how to safely use, store and throw away your medicines at www.disposemymeds.org.          This list is accurate as of 2/1/18  9:44 AM.  Always use your most recent med list.                   Brand Name Dispense Instructions for use Diagnosis    cyclobenzaprine 10 MG tablet    FLEXERIL    30 tablet    Take 1 tablet (10 mg) by mouth 2 times daily as needed for muscle spasms    Back strain, subsequent encounter       IBUPROFEN PO      Take 800 mg by mouth        naproxen 500 MG tablet    NAPROSYN    60 tablet    Take 1 tablet (500 mg) by mouth 2 times daily as needed for moderate pain    Back strain, subsequent encounter       oxyCODONE IR 5 MG tablet    ROXICODONE    50 tablet    Take 1 tablet (5 mg) by mouth every 6 hours as needed for pain    Acute bilateral low back pain without sciatica       TYLENOL PO      Take 1,000 mg by mouth

## 2018-02-02 ENCOUNTER — OFFICE VISIT (OUTPATIENT)
Dept: FAMILY MEDICINE | Facility: OTHER | Age: 23
End: 2018-02-02
Payer: COMMERCIAL

## 2018-02-02 VITALS
OXYGEN SATURATION: 98 % | TEMPERATURE: 97.8 F | WEIGHT: 293 LBS | BODY MASS INDEX: 45.61 KG/M2 | HEART RATE: 100 BPM | RESPIRATION RATE: 18 BRPM | DIASTOLIC BLOOD PRESSURE: 78 MMHG | SYSTOLIC BLOOD PRESSURE: 144 MMHG

## 2018-02-02 DIAGNOSIS — S39.012D BACK STRAIN, SUBSEQUENT ENCOUNTER: ICD-10-CM

## 2018-02-02 DIAGNOSIS — E66.01 MORBID OBESITY WITH BMI OF 40.0-44.9, ADULT (H): ICD-10-CM

## 2018-02-02 DIAGNOSIS — M54.50 ACUTE BILATERAL LOW BACK PAIN WITHOUT SCIATICA: Primary | ICD-10-CM

## 2018-02-02 PROCEDURE — 99214 OFFICE O/P EST MOD 30 MIN: CPT | Performed by: PHYSICIAN ASSISTANT

## 2018-02-02 RX ORDER — NAPROXEN 500 MG/1
500 TABLET ORAL 2 TIMES DAILY PRN
Qty: 60 TABLET | Refills: 3 | Status: SHIPPED | OUTPATIENT
Start: 2018-02-02 | End: 2018-05-30

## 2018-02-02 RX ORDER — CYCLOBENZAPRINE HCL 10 MG
10 TABLET ORAL 2 TIMES DAILY PRN
Qty: 60 TABLET | Refills: 1 | Status: SHIPPED | OUTPATIENT
Start: 2018-02-02 | End: 2018-02-02

## 2018-02-02 RX ORDER — METHOCARBAMOL 750 MG/1
750 TABLET, FILM COATED ORAL 4 TIMES DAILY PRN
Qty: 90 TABLET | Refills: 3 | Status: SHIPPED | OUTPATIENT
Start: 2018-02-02 | End: 2018-05-30

## 2018-02-02 RX ORDER — OXYCODONE HYDROCHLORIDE 5 MG/1
5 TABLET ORAL EVERY 6 HOURS PRN
Qty: 60 TABLET | Refills: 0 | Status: SHIPPED | OUTPATIENT
Start: 2018-02-02 | End: 2018-02-23

## 2018-02-02 ASSESSMENT — PAIN SCALES - GENERAL: PAINLEVEL: EXTREME PAIN (8)

## 2018-02-02 NOTE — NURSING NOTE
"Chief Complaint   Patient presents with     Work Comp     Back Injury     Health Maintenance     janis, mychart, tdap, hpv, flu, chlamydia       Initial /78  Pulse 100  Temp 97.8  F (36.6  C) (Oral)  Resp 18  Wt (!) 327 lb (148.3 kg)  LMP 01/11/2018 (Approximate)  SpO2 98%  BMI 45.61 kg/m2 Estimated body mass index is 45.61 kg/(m^2) as calculated from the following:    Height as of 1/16/18: 5' 11\" (1.803 m).    Weight as of this encounter: 327 lb (148.3 kg).  Medication Reconciliation: complete    "

## 2018-02-02 NOTE — MR AVS SNAPSHOT
After Visit Summary   2/2/2018    Linette Rosa    MRN: [id number]           Patient Information     Date Of Birth          1995        Visit Information        Provider Department      2/2/2018 9:00 AM Lorraine Hopkins PA-C [facility]        Today's Diagnoses     Acute bilateral low back pain without sciatica    -  1    Back strain, subsequent encounter          Care Instructions    1. Schedule MRI   2. Follow up 2-3 days later with neurosurgery team   3. Recheck with provider 2-3 weeks                 Follow-ups after your visit        Additional Services     NEUROSURGERY REFERRAL       Your provider has referred you to: PREFERRED PROVIDERS:  [facility]: [facility] [phone]   [email or web address]    Please be aware that coverage of these services is subject to the terms and limitations of your health insurance plan.  Call member services at your health plan with any benefit or coverage questions.      Please bring the following with you to your appointment:    (1) Any X-Rays, CTs or MRIs which have been performed.  Contact the facility where they were done to arrange for  prior to your scheduled appointment.   (2) List of current medications  (3) This referral request   (4) Any documents/labs given to you for this referral                  Your next 10 appointments already scheduled     Feb 05, 2018 10:00 AM CST   MELISSA Spine with Ar Padgett PT   [facility] Physical Therapy ([facility]  )    [address]  [facility] [address]   [phone]            Feb 08, 2018  9:40 AM CST   MELISSA Spine with Ar Padgett PT   [facility] Therapy ([facility]  )    [address]  [facility] [address]   [phone]            Feb 19, 2018 10:15 AM CST   (Arrive by 10:00 AM)   MR LUMBAR SPINE W/O CONTRAST with  PHMR1   [facility] MRI ([facility])    [address] [facility] [id number] [phone]           Take your medicines as usual, unless your doctor tells you not to. Bring a list of your current medicines to your exam (including vitamins, minerals and over-the-counter drugs). Also bring the results of similar scans you may have had.  Please remove any body piercings and hair extensions before you arrive.  Follow your doctor s orders. If you do not, we may have to postpone your exam.  You will not have contrast for this exam. You do not need to do anything special to prepare.  The MRI machine uses a strong magnet. Please wear clothes without metal (snaps, zippers). A sweatsuit works well, or we may give you a hospital gown.   **IMPORTANT** THE INSTRUCTIONS BELOW ARE ONLY FOR THOSE PATIENTS WHO HAVE BEEN TOLD THEY WILL RECEIVE SEDATION OR GENERAL ANESTHESIA DURING THEIR MRI PROCEDURE:  IF YOU WILL RECEIVE SEDATION (take medicine to help you relax during your exam):   You must get the medicine from your doctor before you arrive. Bring the medicine to the exam. Do not take it at home.   Arrive one hour early. Bring someone who can take you home after the test. Your medicine will make you sleepy. After the exam, you may not drive, take a bus or take a taxi by yourself.   No eating 8 hours before your exam. You may have clear liquids up until 4 hours before your exam. (Clear liquids include water, clear tea, black coffee and fruit juice without pulp.)  IF YOU WILL RECEIVE ANESTHESIA (be asleep for your exam):   Arrive 1 1/2 hours early. Bring someone who can take you home after the test. You may not drive, take a bus or take a taxi by yourself.   No eating 8 hours before your exam. You may have clear liquids up until 4 hours before your exam. (Clear liquids include water, clear tea, black coffee and fruit juice without pulp.)   You will spend four to five hours in the recovery room.   "Please call the Imaging Department at your exam site with any questions.            Feb 22, 2018  2:10 PM CST   New Visit with Isha Stewart PA-C   [facility]    [address] [facility] [id number] [phone]            Feb 23, 2018 10:00 AM CST   [facility] with [facility]    [address] [place] [address]  [facility] [address] [phone]           Bring a current list of meds and any records pertaining to this visit. For Physicals, please bring immunization records and any forms needing to be filled out. Please arrive 10 minutes early to complete paperwork.              Future tests that were ordered for you today     Open Future Orders        Priority Expected Expires Ordered    MR Lumbar Spine w/o Contrast Routine  2/2/2019 2/2/2018            Who to contact     If you have questions or need follow up information about today's clinic visit or your schedule please contact [facility] directly at [phone].  Normal or non-critical lab and imaging results will be communicated to you by MyChart, letter or phone within 4 business days after the clinic has received the results. If you do not hear from us within 7 days, please contact the clinic through [employer] or phone. If you have a critical or abnormal lab result, we will notify you by phone as soon as possible.  Submit refill requests through Passiv[employer]s or call your pharmacy and they will forward the refill request to us. Please allow 3 business days for your refill to be completed.          Additional Information About Your Visit        MyChart Information     PassivSystems lets you send messages to your doctor, view your test results, renew your prescriptions, schedule appointments and more. To sign up, go to [email or web address] . Click on \"Log in\" on the left side of the screen, which will take you to " "the Welcome page. Then click on \"Sign up Now\" on the right side of the page.     You will be asked to enter the access code listed below, as well as some personal information. Please follow the directions to create your username and password.     Your access code is: [email or web address]  Expires: 2018  9:44 AM     Your access code will  in 90 days. If you need help or a new code, please call your [facility] clinic or [phone].        Care EveryWhere ID     This is your Care EveryWhere ID. This could be used by other organizations to access your [facility] medical records  [id number]        Your Vitals Were     Pulse Temperature Respirations Last Period Pulse Oximetry BMI (Body Mass Index)    100 97.8  F (36.6  C) (Oral) 18 2018 (Approximate) 98% 45.61 kg/m2       Blood Pressure from Last 3 Encounters:   18 144/78   18 132/70   18 (!) 157/98    Weight from Last 3 Encounters:   18 (!) 327 lb (148.3 kg)   18 (!) 332 lb (150.6 kg)   18 (!) 332 lb (150.6 kg)              We Performed the Following     NEUROSURGERY REFERRAL          Today's Medication Changes          These changes are accurate as of 18  9:22 AM.  If you have any questions, ask your nurse or doctor.               Start taking these medicines.        Dose/Directions    methocarbamol 750 MG tablet   Commonly known as:  ROBAXIN   Used for:  Acute bilateral low back pain without sciatica, Back strain, subsequent encounter   Started by:  Lorraine Hopkins PA-C        Dose:  750 mg   Take 1 tablet (750 mg) by mouth 4 times daily as needed for muscle spasms   Quantity:  90 tablet   Refills:  3         Stop taking these medicines if you haven't already. Please contact your care team if you have questions.     cyclobenzaprine 10 MG tablet   Commonly known as:  FLEXERIL   Stopped by:  Lorraine Hopkins PA-C                Where to get your medicines      These medications were sent to " [facility] - [place], [place] - [address]  [address], [facility] [address]     Phone:  [phone]     methocarbamol 750 MG tablet    naproxen 500 MG tablet         Some of these will need a paper prescription and others can be bought over the counter.  Ask your nurse if you have questions.     Bring a paper prescription for each of these medications     oxyCODONE IR 5 MG tablet                Primary Care Provider Fax #    Physician No Ref-Primary [phone]       No address on file        Equal Access to Services     GALE NASCIMENTO : Hadii aad ku hadasho Soomaali, waaxda luqadaha, qaybta kaalmada adeegyada, chandra wiggins . So [facility] [phone].    ATENCIÓN: Si habla español, tiene a oscar disposición servicios gratuitos de asistencia lingüística. Llame al [phone].    We comply with applicable federal civil rights laws and [place] laws. We do not discriminate on the basis of race, color, national origin, age, disability, sex, sexual orientation, or gender identity.            Thank you!     Thank you for choosing [facility]  for your care. Our goal is always to provide you with excellent care. Hearing back from our patients is one way we can continue to improve our services. Please take a few minutes to complete the written survey that you may receive in the mail after your visit with us. Thank you!             Your Updated Medication List - Protect others around you: Learn how to safely use, store and throw away your medicines at www.disposemymeds.org.          This list is accurate as of 2/2/18  9:22 AM.  Always use your most recent med list.                   Brand Name Dispense Instructions for use Diagnosis    IBUPROFEN PO      Take 800 mg by mouth        methocarbamol 750 MG tablet    ROBAXIN    90 tablet    Take 1 tablet (750 mg) by mouth 4 times daily as needed for muscle spasms    Acute bilateral low back pain without sciatica, Back  strain, subsequent encounter       naproxen 500 MG tablet    NAPROSYN    60 tablet    Take 1 tablet (500 mg) by mouth 2 times daily as needed for moderate pain    Back strain, subsequent encounter       oxyCODONE IR 5 MG tablet    ROXICODONE    60 tablet    Take 1 tablet (5 mg) by mouth every 6 hours as needed for pain    Acute bilateral low back pain without sciatica       TYLENOL PO      Take 1,000 mg by mouth

## 2018-02-05 ENCOUNTER — THERAPY VISIT (OUTPATIENT)
Dept: PHYSICAL THERAPY | Facility: CLINIC | Age: 23
End: 2018-02-05
Payer: OTHER MISCELLANEOUS

## 2018-02-05 DIAGNOSIS — M54.50 ACUTE BILATERAL LOW BACK PAIN WITHOUT SCIATICA: ICD-10-CM

## 2018-02-05 PROCEDURE — 97110 THERAPEUTIC EXERCISES: CPT | Mod: GP | Performed by: PHYSICAL THERAPIST

## 2018-02-08 ENCOUNTER — THERAPY VISIT (OUTPATIENT)
Dept: PHYSICAL THERAPY | Facility: CLINIC | Age: 23
End: 2018-02-08
Payer: OTHER MISCELLANEOUS

## 2018-02-08 DIAGNOSIS — M54.50 ACUTE BILATERAL LOW BACK PAIN WITHOUT SCIATICA: ICD-10-CM

## 2018-02-08 PROCEDURE — 97110 THERAPEUTIC EXERCISES: CPT | Mod: GP | Performed by: PHYSICAL THERAPIST

## 2018-02-08 PROCEDURE — 97140 MANUAL THERAPY 1/> REGIONS: CPT | Mod: GP | Performed by: PHYSICAL THERAPIST

## 2018-02-08 NOTE — MR AVS SNAPSHOT
After Visit Summary   2/8/2018    Linette Rosa    MRN: [id number]           Patient Information     Date Of Birth          1995        Visit Information        Provider Department      2/8/2018 9:40 AM Ar Padgett PT [facility] Physical [facility]        Today's Diagnoses     Acute bilateral low back pain without sciatica           Follow-ups after your visit        Your next 10 appointments already scheduled     Feb 19, 2018 10:15 AM CST   (Arrive by 10:00 AM)   MR LUMBAR SPINE W/O CONTRAST with PHMR1   [facility] ([facility])    [address] [place] [id number] [phone]           Take your medicines as usual, unless your doctor tells you not to. Bring a list of your current medicines to your exam (including vitamins, minerals and over-the-counter drugs). Also bring the results of similar scans you may have had.  Please remove any body piercings and hair extensions before you arrive.  Follow your doctor s orders. If you do not, we may have to postpone your exam.  You may or may not receive IV contrast for this exam pending the discretion of the Radiologist.  You do not need to do anything special to prepare.  The MRI machine uses a strong magnet. Please wear clothes without metal (snaps, zippers). A sweatsuit works well, or we may give you a hospital gown.   **IMPORTANT** THE INSTRUCTIONS BELOW ARE ONLY FOR THOSE PATIENTS WHO HAVE BEEN PRESCRIBED SEDATION OR GENERAL ANESTHESIA DURING THEIR MRI PROCEDURE:  IF YOUR DOCTOR PRESCRIBED ORAL SEDATION (take medicine to help you relax during your exam):   You must get the medicine from your doctor (oral medication) before you arrive. Bring the medicine to the exam. Do not take it at home. You ll be told when to take it upon arriving for your exam.   Arrive one hour early. Bring someone who can take you home after the test. Your medicine will make you sleepy. After the exam,  you may not drive, take a bus or take a taxi by yourself.  IF YOUR DOCTOR PRESCRIBED IV SEDATION:   Arrive one hour early. Bring someone who can take you home after the test. Your medicine will make you sleepy. After the exam, you may not drive, take a bus or take a taxi by yourself.   No eating 6 hours before your exam. You may have clear liquids up until 4 hours before your exam. (Clear liquids include water, clear tea, black coffee and fruit juice without pulp.)  IF YOUR DOCTOR PRESCRIBED ANESTHESIA (be asleep for your exam):   Arrive 1 1/2 hours early. Bring someone who can take you home after the test. You may not drive, take a bus or take a taxi by yourself.   No eating 8 hours before your exam. You may have clear liquids up until 4 hours before your exam. (Clear liquids include water, clear tea, black coffee and fruit juice without pulp.)   You will spend four to five hours in the recovery room.  Please call the Imaging Department at your exam site with any questions.            Feb 22, 2018  2:10 PM CST   [facility]    [address] [facility] [id number] [phone]            Feb 23, 2018 10:00 AM CST   Office Visit with Lorraine Hopkins PA-C   [facility]    [address] [place] [id number] [phone]           Bring a current list of meds and any records pertaining to this visit. For Physicals, please bring immunization records and any forms needing to be filled out. Please arrive 10 minutes early to complete paperwork.              Who to contact     If you have questions or need follow up information about today's clinic visit or your schedule please contact [facility] directly at [phone].  Normal or non-critical lab and imaging results will be communicated to you by Court  "letter or phone within 4 business days after the clinic has received the results. If you do not hear from us within 7 days, please contact the clinic through [employer] or phone. If you have a critical or abnormal lab result, we will notify you by phone as soon as possible.  Submit refill requests through [employer] or call your pharmacy and they will forward the refill request to us. Please allow 3 business days for your refill to be completed.          Additional Information About Your Visit        [facility] Information     [employer] lets you send messages to your doctor, view your test results, renew your prescriptions, schedule appointments and more. To sign up, go to [email or web address] . Click on \"Log in\" on the left side of the screen, which will take you to the Welcome page. Then click on \"Sign up Now\" on the right side of the page.     You will be asked to enter the access code listed below, as well as some personal information. Please follow the directions to create your username and password.     Your access code is: [email or web address]  Expires: 2018  9:44 AM     Your access code will  in 90 days. If you need help or a new code, please call your [place] clinic or [phone].        Care EveryWhere ID     This is your Care EveryWhere ID. This could be used by other organizations to access your [place] medical records  [id number]        Your Vitals Were     Last Period                   2018 (Approximate)            Blood Pressure from Last 3 Encounters:   18 144/78   18 132/70   18 (!) 157/98    Weight from Last 3 Encounters:   18 (!) 148.3 kg (327 lb)   18 (!) 150.6 kg (332 lb)   18 (!) 150.6 kg (332 lb)              We Performed the Following     MANUAL THER TECH,1+REGIONS,EA 15 MIN     THERAPEUTIC EXERCISES        Primary Care Provider Fax #    Physician No Ref-Primary [phone]       No address on file        Equal Access to Services     GALE NASCIMENTO AH: " Hadii aad ku hadsabinao Soleeannaali, waaxda luqadaha, qaybta kaalmichelle reyes, chandra lillianin hayaan vesnabaldev cervantes lahamiltonchristian katiana. So [facility] [phone].    ATENCIÓN: Si gissell salmeron, tiene a oscar disposición servicios gratuitos de asistencia lingüística. Llame al [phone].    We comply with applicable federal civil rights laws and [place] laws. We do not discriminate on the basis of race, color, national origin, age, disability, sex, sexual orientation, or gender identity.            Thank you!     Thank you for choosing [facility]  for your care. Our goal is always to provide you with excellent care. Hearing back from our patients is one way we can continue to improve our services. Please take a few minutes to complete the written survey that you may receive in the mail after your visit with us. Thank you!             Your Updated Medication List - Protect others around you: Learn how to safely use, store and throw away your medicines at www.disposemymeds.org.          This list is accurate as of 2/8/18 10:24 AM.  Always use your most recent med list.                   Brand Name Dispense Instructions for use Diagnosis    IBUPROFEN PO      Take 800 mg by mouth        methocarbamol 750 MG tablet    ROBAXIN    90 tablet    Take 1 tablet (750 mg) by mouth 4 times daily as needed for muscle spasms    Acute bilateral low back pain without sciatica, Back strain, subsequent encounter       naproxen 500 MG tablet    NAPROSYN    60 tablet    Take 1 tablet (500 mg) by mouth 2 times daily as needed for moderate pain    Back strain, subsequent encounter       oxyCODONE IR 5 MG tablet    ROXICODONE    60 tablet    Take 1 tablet (5 mg) by mouth every 6 hours as needed for pain    Acute bilateral low back pain without sciatica       TYLENOL PO      Take 1,000 mg by mouth

## 2018-02-12 ENCOUNTER — TELEPHONE (OUTPATIENT)
Dept: FAMILY MEDICINE | Facility: OTHER | Age: 23
End: 2018-02-12

## 2018-02-12 DIAGNOSIS — M54.50 ACUTE BILATERAL LOW BACK PAIN WITHOUT SCIATICA: Primary | ICD-10-CM

## 2018-02-12 RX ORDER — DIAZEPAM 10 MG
10 TABLET ORAL EVERY 6 HOURS PRN
Qty: 1 TABLET | Refills: 0 | Status: SHIPPED | OUTPATIENT
Start: 2018-02-12 | End: 2018-02-22

## 2018-02-12 NOTE — TELEPHONE ENCOUNTER
Reason for Call:  Medication or medication refill:    Do you use a [facility]?  Name of the pharmacy and phone number for the current request:  [facility] [phone]    Name of the medication requested:     Other request: patient has an appointment for an MRI on 02/169/2018 and the patient would like a medication for anxiety for this.    Can we leave a detailed message on this number? YES    Phone number patient can be reached at: Home number on file [phone] (home) or Cell number on file:    Telephone Information:   Mobile [phone]       Best Time: anytime    Call taken on 2/12/2018 at 10:05 AM by Rachel Newell

## 2018-02-12 NOTE — TELEPHONE ENCOUNTER
Rx for valium placed in MA task. Please let patient know will need a  if she takes this.     Misael Hopkins PA-C  [facility]

## 2018-02-19 ENCOUNTER — HOSPITAL ENCOUNTER (OUTPATIENT)
Dept: MRI IMAGING | Facility: CLINIC | Age: 23
Discharge: HOME | End: 2018-02-19
Attending: PHYSICIAN ASSISTANT | Admitting: PHYSICIAN ASSISTANT
Payer: OTHER MISCELLANEOUS

## 2018-02-19 DIAGNOSIS — M54.50 ACUTE BILATERAL LOW BACK PAIN WITHOUT SCIATICA: ICD-10-CM

## 2018-02-19 PROCEDURE — 72148 MRI LUMBAR SPINE W/O DYE: CPT

## 2018-02-19 NOTE — PROGRESS NOTES
SUBJECTIVE:   Linette Rosa is a 22 year old female who presents to clinic today for the following health issues:    HPI  Back Pain Follow Up      Description:   Location of pain:  bilateral  Character of pain: sharp and dull ache  Pain radiation: radiates into the right buttocks and radiates into the left buttocks  Since last visit, pain is:  unchanged  New numbness or weakness in legs, not attributed to pain:  no     Intensity: Currently 8/10    History:   Pain interferes with job: YES  Therapies tried without relief: ice/heat - repositioning, medication  Therapies tried with relief: none     - Got MRI 2/19/18   - Saw neurosurgery yesterday (2/23/18)  - Continues going to physical therapy, but told to wait until after injection     Doing stretches at home    - Injection now scheduled for 3/8/18   - Told if not better in 2 weeks, will need surgery   - Pain medication helping minimally   - 20 oxy left   - Continues Naproxen and Robaxen   - Needs updated work form       Plan from neurosurgery yesterday   Linette Rosa is a 22 year old female who presents for evaluation of low back and right leg pain x 6 weeks. 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 Pain is located in right low back and radiates down posterolateral right leg to the mid foot. Describes the pain as constant and sharp and throbbing.  MRI reviewed indetail with patient and she does understand that she has a right sided disc protrusion at L5-S1 abutting the right S1 nerve root, correlating with her symptoms. She has completed physical therapy without relief, so we discussed trying SAFIA. I have referred patient for right transforaminal L5-S1 SAFIA. Advised patient to contact our clinic if symptoms persist following this. Patient voiced understanding and agreement.         Plan from last visit 2/2/18   - Work injury from 1/8/18, symptoms persist, will get MRI   - No imaging was done, physical therapy not  helping, conservative therapy not helping, symptoms persist will get MRI and consult with neurosurgery   - Weight loss recommended   -  reviewed, only fill was from ED provider as above   - Continue with light duty at work for 3 weeks, will reassess at each appointment     Form completed and given to patient, also scanned into chart    - Continue twice daily Naproxen 500 mg, reviewed use and side effects  - Flexeril was not helping      Will stop and switch to Robaxin, discussed use and side effects   - Trial of Oxycodone 5 mg, 3-4/day, try to keep as low as possible      Reviewed use and side effects including sedation and addiction, no driving on this medication  - Recheck every 3 weeks           Problem list and histories reviewed & adjusted, as indicated.  Additional history: as documented    Labs/Imaging reviewed in EPIC    ROS:  Constitutional, HEENT, cardiovascular, pulmonary, gi and gu systems are negative, except as otherwise noted.    OBJECTIVE:   /78  Pulse 105  Temp 98.5  F (36.9  C) (Oral)  Resp 20  Wt (!) 335 lb (152 kg)  SpO2 98%  BMI 46.72 kg/m2  Body mass index is 46.72 kg/(m^2).  GENERAL APPEARANCE: healthy, alert and no distress - occasionally in pain when moves   EYES: Eyes grossly normal to inspection, PERRLA, conjunctivae and sclerae without injection or discharge, EOM intact   MS: No musculoskeletal defects are noted and gait is age appropriate without ataxia   SKIN: No suspicious lesions or rashes, hydration status appears adeuqate with normal skin turgor   BACK: Deferred due to pain   PSYCH: Alert and oriented x3; speech- coherent , normal rate and volume; able to articulate logical thoughts, able to abstract reason, no tangential thoughts, no hallucinations or delusions, mentation appears normal, Mood is euthymic. Affect is appropriate for this mood state and bright. Thought content is free of suicidal ideation, hallucinations, and delusions. Dress is adequate and upkept. Eye  contact is good during conversation.       Diagnostic Test Results:  none     ASSESSMENT/PLAN:       ICD-10-CM    1. Acute bilateral low back pain without sciatica M54.5 oxyCODONE IR (ROXICODONE) 5 MG tablet   2. Lumbar radiculopathy M54.16    3. Lumbar disc herniation with radiculopathy M51.16    4. Morbid obesity due to excess calories (H) E66.01      - Work injury from 1/8/18, symptoms persisted after conservative therapy with physical therapy, MRI was obtained showing herniated disc at L5-S1, patient then consulted neurosurgery who recommended SAFIA procedure, this is scheduled for 3/81/18 (~2 weeks), was told if this fails within 2 weeks after injection, will need surgery     -  reviewed, only fill was from ED provider and then myself  - Continue with light duty at work for 4 weeks, will reassess at each appointment     Form completed and given to patient, also scanned into chart    - Continue twice daily Naproxen 500 mg, reviewed use and side effects  - Robaxin helping mildly, will continue, discussed use and side effects   - Oxycodone 5 mg, 3-4/day, try to keep as low as possible      Reviewed use and side effects including sedation and addiction, no driving on this medication  - Recheck will be pushed out to 4 weeks due to injection in 2 weeks and will know within 2 weeks after that if needs surgery     - Patient was called during exam, needs Pre-op before her SAFIA, we scheduled this a few days before her procedure       The patient indicates understanding of these issues and agrees with the plan.    Follow up: 4 weeks for routine check, pre-op on 3/6/18, injection 3/8/18      Lorraine Hopkins PA-C  [facility]

## 2018-02-20 NOTE — PROGRESS NOTES
Please call patient with the following message    MRI showed a moderate disc bulge (herniated disc) at L5-S1. Continue with plan to see neurosurgery and follow up with me as scheduled.     Misael Hopkins PA-C

## 2018-02-22 ENCOUNTER — OFFICE VISIT (OUTPATIENT)
Dept: NEUROSURGERY | Facility: CLINIC | Age: 23
End: 2018-02-22
Payer: OTHER MISCELLANEOUS

## 2018-02-22 VITALS — BODY MASS INDEX: 41.02 KG/M2 | HEIGHT: 71 IN | TEMPERATURE: 97.9 F | WEIGHT: 293 LBS

## 2018-02-22 DIAGNOSIS — M54.16 LUMBAR RADICULOPATHY: Primary | ICD-10-CM

## 2018-02-22 PROCEDURE — 99203 OFFICE O/P NEW LOW 30 MIN: CPT | Performed by: PHYSICIAN ASSISTANT

## 2018-02-22 ASSESSMENT — PAIN SCALES - GENERAL: PAINLEVEL: EXTREME PAIN (9)

## 2018-02-22 NOTE — LETTER
2/22/2018         RE: Linette Rosa  [address] [address]        Dear Colleague,    Thank you for referring your patient, Linette Rosa, to the [facility]. Please see a copy of my visit note below.    Dr. Sunny Kirkland  [facility]  Neurosurgery Clinic Visit      CC: Low back and right leg pain    Primary care Provider: No Ref-Primary, Physician      Reason For Visit:   I was asked by Lorraine Cho PA-C to consult on the patient for acute bilateral low back pain without sciatica.      HPI: Linette Rosa is a 22 year old female who presents for evaluation of low back and right leg pain x 6 weeks. 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 Pain is located in right low back and radiates down posterolateral right leg to the mid foot. Describes the pain as constant and sharp and throbbing. Pain is worsened with lifting heavy items, but she has been placed on restrictions at work so she hasn't been doing this. Cannot recall anything that alleviates the pain. She has tried oxycodone and robaxin, which have not helped alleviate the pain. She has completed physical therapy without relief. She has not had any injections. Denies bladder/bowel incontinence.    Current pain: 9/10 At worst: 9/10    Past Medical History reviewed with patient during visit.    Past Surgical History:   Procedure Laterality Date     WRIST SURGERY       Past Surgical History reviewed with patient during visit.    Current Outpatient Prescriptions   Medication     diazepam (VALIUM) 10 MG tablet     naproxen (NAPROSYN) 500 MG tablet     oxyCODONE IR (ROXICODONE) 5 MG tablet     methocarbamol (ROBAXIN) 750 MG tablet     Acetaminophen (TYLENOL PO)     IBUPROFEN PO     No current facility-administered medications for this visit.        Allergies   Allergen Reactions     Tina Oil [Fish Oil] Swelling       Social History     Social History     Marital  status: Single     Spouse name: N/A     Number of children: N/A     Years of education: N/A     Social History Main Topics     Smoking status: Current Every Day Smoker     Packs/day: 0.50     Years: 9.00     Types: Cigarettes     Smokeless tobacco: Never Used      Comment: started age 12, officially  when she was 16     Alcohol use 0.0 oz/week     0 Standard drinks or equivalent per week      Comment: occasionally     Drug use: No     Sexual activity: Yes     Partners: Male     Birth control/ protection: IUD      Comment: single, no children.      Other Topics Concern     Not on file     Social History Narrative       Family History   Problem Relation Age of Onset     DIABETES Mother      Unknown/Adopted Maternal Grandmother      Unknown/Adopted Maternal Grandfather      Unknown/Adopted Paternal Grandmother      Unknown/Adopted Paternal Grandfather           ROS: 10 point ROS neg other than the symptoms noted above in the HPI.    Vital Signs: There were no vitals taken for this visit.    Examination:  Constitutional:  Alert, obese, NAD.  HEENT: Normocephalic, atraumatic.   Pulmonary:  Without shortness of breath, normal effort.   Lymph: no lymphadenopathy to low back or LE.   Integumentary: Skin is free of rashes or lesions.   Cardiovascular:  No pitting edema of BLE.      Neurological:  Awake  Alert  Oriented x 3  Speech clear  Cranial nerves II - XII grossly intact  PERRL  EOMI  Face symmetric  Tongue midline  Motor exam   Hip Flexor:                Right: 5/5  Left:  5/5  Hip Adductor:             Right:  5/5  Left:  5/5  Hip Abductor:             Right:  5/5  Left:  5/5  Gastroc Soleus:        Right:  5/5  Left:  5/5  Tib/Ant:                      Right:  5/5  Left:  5/5  EHL:                          Right:  5/5  Left:  5/5       Sensation normal to bilateral upper and lower extremities.    Reflexes are 2+ in the patellar and Achilles. There is no clonus. Downgoing Babinski.  Musculoskeletal:  Gait: Able to  stand from a seated position. Normal non-antalgic, non-myelopathic gait.  Able to heel/toe walk without loss of balance  Lumbar examination reveals tenderness of the mid spine in the lower lumbar region. No paraspinous muscle tenderness.  Hip height is symmetrical. Negative SI joint, sciatic notch or greater trochanteric tenderness to palpation bilaterally.  Straight leg raise positive on the right.     Imaging:   MRI of the lumbar spine from 2/19/2018 was reviewed in the office today. Reveals right sided disc protrusion at L5-S1 abutting on the right S1 nerve root.    Assessment/Plan:   Linette Rosa is a 22 year old female who presents for evaluation of low back and right leg pain x 6 weeks. 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 Pain is located in right low back and radiates down posterolateral right leg to the mid foot. Describes the pain as constant and sharp and throbbing.  MRI reviewed indetail with patient and she does understand that she has a right sided disc protrusion at L5-S1 abutting the right S1 nerve root, correlating with her symptoms. She has completed physical therapy without relief, so we discussed trying SAFIA. I have referred patient for right transforaminal L5-S1 SAFIA. Advised patient to contact our clinic if symptoms persist following this. Patient voiced understanding and agreement.        Isha Stewart PA-C  [facility]  [address] [address]    Tel [phone]  Pager [phone]      Again, thank you for allowing me to participate in the care of your patient.        Sincerely,        Isha Stewart PA-C

## 2018-02-22 NOTE — PATIENT INSTRUCTIONS
Lumbar steroid injection ordered - they will contact you in 1-2 days to schedule    Please contact the clinic if pain persists at [phone].

## 2018-02-22 NOTE — PROGRESS NOTES
Dr. Sunny Kirkland  [facility]  Neurosurgery Clinic Visit      CC: Low back and right leg pain    Primary care Provider: No Ref-Primary, Physician      Reason For Visit:   I was asked by Lorraine Cho PA-C to consult on the patient for acute bilateral low back pain without sciatica.      HPI: Linette Rosa is a 22 year old female who presents for evaluation of low back and right leg pain x 6 weeks. 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 Pain is located in right low back and radiates down posterolateral right leg to the mid foot. Describes the pain as constant and sharp and throbbing. Pain is worsened with lifting heavy items, but she has been placed on restrictions at work so she hasn't been doing this. Cannot recall anything that alleviates the pain. She has tried oxycodone and robaxin, which have not helped alleviate the pain. She has completed physical therapy without relief. She has not had any injections. Denies bladder/bowel incontinence.    Current pain: 9/10 At worst: 9/10    Past Medical History reviewed with patient during visit.    Past Surgical History:   Procedure Laterality Date     WRIST SURGERY       Past Surgical History reviewed with patient during visit.    Current Outpatient Prescriptions   Medication     diazepam (VALIUM) 10 MG tablet     naproxen (NAPROSYN) 500 MG tablet     oxyCODONE IR (ROXICODONE) 5 MG tablet     methocarbamol (ROBAXIN) 750 MG tablet     Acetaminophen (TYLENOL PO)     IBUPROFEN PO     No current facility-administered medications for this visit.        Allergies   Allergen Reactions     [place] Oil [Fish Oil] Swelling       Social History     Social History     Marital status: Single     Spouse name: N/A     Number of children: N/A     Years of education: N/A     Social History Main Topics     Smoking status: Current Every Day Smoker     Packs/day: 0.50     Years: 9.00     Types: Cigarettes      Smokeless tobacco: Never Used      Comment: started age 12, officially  when she was 16     Alcohol use 0.0 oz/week     0 Standard drinks or equivalent per week      Comment: occasionally     Drug use: No     Sexual activity: Yes     Partners: Male     Birth control/ protection: IUD      Comment: single, no children.      Other Topics Concern     Not on file     Social History Narrative       Family History   Problem Relation Age of Onset     DIABETES Mother      Unknown/Adopted Maternal Grandmother      Unknown/Adopted Maternal Grandfather      Unknown/Adopted Paternal Grandmother      Unknown/Adopted Paternal Grandfather           ROS: 10 point ROS neg other than the symptoms noted above in the HPI.    Vital Signs: There were no vitals taken for this visit.    Examination:  Constitutional:  Alert, obese, NAD.  HEENT: Normocephalic, atraumatic.   Pulmonary:  Without shortness of breath, normal effort.   Lymph: no lymphadenopathy to low back or LE.   Integumentary: Skin is free of rashes or lesions.   Cardiovascular:  No pitting edema of BLE.      Neurological:  Awake  Alert  Oriented x 3  Speech clear  Cranial nerves II - XII grossly intact  PERRL  EOMI  Face symmetric  Tongue midline  Motor exam   Hip Flexor:                Right: 5/5  Left:  5/5  Hip Adductor:             Right:  5/5  Left:  5/5  Hip Abductor:             Right:  5/5  Left:  5/5  Gastroc Soleus:        Right:  5/5  Left:  5/5  Tib/Ant:                      Right:  5/5  Left:  5/5  EHL:                          Right:  5/5  Left:  5/5       Sensation normal to bilateral upper and lower extremities.    Reflexes are 2+ in the patellar and Achilles. There is no clonus. Downgoing Babinski.  Musculoskeletal:  Gait: Able to stand from a seated position. Normal non-antalgic, non-myelopathic gait.  Able to heel/toe walk without loss of balance  Lumbar examination reveals tenderness of the mid spine in the lower lumbar region. No paraspinous muscle  tenderness.  Hip height is symmetrical. Negative SI joint, sciatic notch or greater trochanteric tenderness to palpation bilaterally.  Straight leg raise positive on the right.     Imaging:   MRI of the lumbar spine from 2/19/2018 was reviewed in the office today. Reveals right sided disc protrusion at L5-S1 abutting on the right S1 nerve root.    Assessment/Plan:   Linette Rosa is a 22 year old female who presents for evaluation of low back and right leg pain x 6 weeks. 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 Pain is located in right low back and radiates down posterolateral right leg to the mid foot. Describes the pain as constant and sharp and throbbing.  MRI reviewed indetail with patient and she does understand that she has a right sided disc protrusion at L5-S1 abutting the right S1 nerve root, correlating with her symptoms. She has completed physical therapy without relief, so we discussed trying SAFIA. I have referred patient for right transforaminal L5-S1 SAFIA. Advised patient to contact our clinic if symptoms persist following this. Patient voiced understanding and agreement.        Isha Stewart PA-C  [facility]  [address]  [address] [place] [address]    Tel [phone]  Pager [phone]

## 2018-02-23 ENCOUNTER — TELEPHONE (OUTPATIENT)
Dept: SURGERY | Facility: CLINIC | Age: 23
End: 2018-02-23

## 2018-02-23 ENCOUNTER — OFFICE VISIT (OUTPATIENT)
Dept: FAMILY MEDICINE | Facility: OTHER | Age: 23
End: 2018-02-23
Payer: OTHER MISCELLANEOUS

## 2018-02-23 VITALS
HEART RATE: 105 BPM | SYSTOLIC BLOOD PRESSURE: 140 MMHG | RESPIRATION RATE: 20 BRPM | TEMPERATURE: 98.5 F | WEIGHT: 293 LBS | BODY MASS INDEX: 46.72 KG/M2 | DIASTOLIC BLOOD PRESSURE: 78 MMHG | OXYGEN SATURATION: 98 %

## 2018-02-23 DIAGNOSIS — E66.01 MORBID OBESITY DUE TO EXCESS CALORIES (H): ICD-10-CM

## 2018-02-23 DIAGNOSIS — M54.50 ACUTE BILATERAL LOW BACK PAIN WITHOUT SCIATICA: Primary | ICD-10-CM

## 2018-02-23 DIAGNOSIS — M54.16 LUMBAR RADICULOPATHY: ICD-10-CM

## 2018-02-23 DIAGNOSIS — M51.16 LUMBAR DISC HERNIATION WITH RADICULOPATHY: ICD-10-CM

## 2018-02-23 PROCEDURE — 99214 OFFICE O/P EST MOD 30 MIN: CPT | Performed by: PHYSICIAN ASSISTANT

## 2018-02-23 RX ORDER — OXYCODONE HYDROCHLORIDE 5 MG/1
5 TABLET ORAL EVERY 6 HOURS PRN
Qty: 60 TABLET | Refills: 0 | Status: SHIPPED | OUTPATIENT
Start: 2018-02-23 | End: 2018-05-30

## 2018-02-23 ASSESSMENT — PAIN SCALES - GENERAL: PAINLEVEL: EXTREME PAIN (8)

## 2018-02-23 NOTE — TELEPHONE ENCOUNTER
Received orders to sched SAFIA. Called pt and she is sched 3/8/2018 @ 1230 w/Tobin, arriving @ 1130. Notified pt that she will need a  and H&P.

## 2018-02-23 NOTE — MR AVS SNAPSHOT
After Visit Summary   2/23/2018    Linette Rosa    MRN: [id number]           Patient Information     Date Of Birth          1995        Visit Information        Provider Department      2/23/2018 10:00 AM Lorraine Hopkins PA-C [facility]        Today's Diagnoses     Acute bilateral low back pain without sciatica    -  1      Care Instructions    - Recheck 1 month           Follow-ups after your visit        Your next 10 appointments already scheduled     Mar 08, 2018   Procedure with Jerman Rudd MD   [facility] ([facility])    [address]   [place] [address]   [phone]           From [address]: Exit at [address] on south side of [place]. Turn right on [address]. Turn left at stoplight on [address]. [facility] will be in view two blocks ahead              Future tests that were ordered for you today     Open Future Orders        Priority Expected Expires Ordered    XR Lumbar Epidural Injection Incl Imaging Routine 2/22/2018 2/22/2019 2/22/2018            Who to contact     If you have questions or need follow up information about today's clinic visit or your schedule please contact [facility] directly at [phone].  Normal or non-critical lab and imaging results will be communicated to you by Launchpilotshart, letter or phone within 4 business days after the clinic has received the results. If you do not hear from us within 7 days, please contact the clinic through Launchpilotshart or phone. If you have a critical or abnormal lab result, we will notify you by phone as soon as possible.  Submit refill requests through OzVision or call your pharmacy and they will forward the refill request to us. Please allow 3 business days for your refill to be completed.          Additional Information About Your Visit        OzVision Information     OzVision lets you send messages to your doctor,  "view your test results, renew your prescriptions, schedule appointments and more. To sign up, go to [email or web address] . Click on \"Log in\" on the left side of the screen, which will take you to the Welcome page. Then click on \"Sign up Now\" on the right side of the page.     You will be asked to enter the access code listed below, as well as some personal information. Please follow the directions to create your username and password.     Your access code is: [email or web address]  Expires: 2018  9:44 AM     Your access code will  in 90 days. If you need help or a new code, please call your [facility] clinic or [phone].        Care EveryWhere ID     This is your Care EveryWhere ID. This could be used by other organizations to access your [facility] medical records  [id number]        Your Vitals Were     Pulse Temperature Respirations Pulse Oximetry BMI (Body Mass Index)       105 98.5  F (36.9  C) (Oral) 20 98% 46.72 kg/m2        Blood Pressure from Last 3 Encounters:   18 146/82   18 144/78   18 132/70    Weight from Last 3 Encounters:   18 (!) 335 lb (152 kg)   18 (!) 335 lb (152 kg)   18 (!) 327 lb (148.3 kg)              Today, you had the following     No orders found for display         Where to get your medicines      Some of these will need a paper prescription and others can be bought over the counter.  Ask your nurse if you have questions.     Bring a paper prescription for each of these medications     oxyCODONE IR 5 MG tablet          Primary Care Provider Fax #    Physician No Ref-Primary [phone]       No address on file        Equal Access to Services     GALE NASCIMENTO : Kerline Vo, guru fuchs, arcelia reyes, chandra saab. So [facility] [phone].    ATENCIÓN: Si habla español, tiene a oscar disposición servicios gratuitos de asistencia lingüística. Llame al [phone].    We comply with " applicable federal civil rights laws and [place] laws. We do not discriminate on the basis of race, color, national origin, age, disability, sex, sexual orientation, or gender identity.            Thank you!     Thank you for choosing [facility]  for your care. Our goal is always to provide you with excellent care. Hearing back from our patients is one way we can continue to improve our services. Please take a few minutes to complete the written survey that you may receive in the mail after your visit with us. Thank you!             Your Updated Medication List - Protect others around you: Learn how to safely use, store and throw away your medicines at www.disposemymeds.org.          This list is accurate as of 2/23/18 10:18 AM.  Always use your most recent med list.                   Brand Name Dispense Instructions for use Diagnosis    IBUPROFEN PO      Take 800 mg by mouth        methocarbamol 750 MG tablet    ROBAXIN    90 tablet    Take 1 tablet (750 mg) by mouth 4 times daily as needed for muscle spasms    Acute bilateral low back pain without sciatica, Back strain, subsequent encounter       naproxen 500 MG tablet    NAPROSYN    60 tablet    Take 1 tablet (500 mg) by mouth 2 times daily as needed for moderate pain    Back strain, subsequent encounter       oxyCODONE IR 5 MG tablet    ROXICODONE    60 tablet    Take 1 tablet (5 mg) by mouth every 6 hours as needed for pain    Acute bilateral low back pain without sciatica       TYLENOL PO      Take 1,000 mg by mouth

## 2018-02-23 NOTE — NURSING NOTE
"Chief Complaint   Patient presents with     Work Comp     Panel Management     mychart, janis, tdap, hpv, flu, chlamydia       Initial /82 (BP Location: Right arm, Patient Position: Chair, Cuff Size: Adult Large)  Pulse 105  Temp 98.5  F (36.9  C) (Oral)  Resp 20  Wt (!) 335 lb (152 kg)  SpO2 98%  BMI 46.72 kg/m2 Estimated body mass index is 46.72 kg/(m^2) as calculated from the following:    Height as of 2/22/18: 5' 11\" (1.803 m).    Weight as of this encounter: 335 lb (152 kg).  Medication Reconciliation: complete  "

## 2018-02-27 NOTE — PROGRESS NOTES
[address]  [facility] [address]  [phone]  Dept: [phone]    Ok for student    PRE-OP EVALUATION:  Today's date: 3/6/2018    Linette Rosa (: 1995) presents for pre-operative evaluation assessment as requested by Dr. Rudd (Isha Stewart PA-C).  She requires evaluation and anesthesia risk assessment prior to undergoing surgery/procedure for treatment of acute bilateral low back pain and injury with disc herniation and radiculopathy.    Proposed Surgery/ Procedure: INJECT EPIDURAL TRANSFORAMINAL  Date of Surgery/ Procedure: 3/8/18  Time of Surgery/ Procedure: 12:30  Hospital/Surgical Facility: [facility]    Primary Physician: Lorraine Hopkins  Type of Anesthesia Anticipated: to be determined    Patient has a Health Care Directive or Living Will:  NO    1. NO - Do you have a history of heart attack, stroke, stent, bypass or surgery on an artery in the head, neck, heart or legs?  2. NO - Do you ever have any pain or discomfort in your chest?  3. NO - Do you have a history of  Heart Failure?  4. YES - ARE YOUR TROUBLED BY SHORTNESS OF BREATH WHEN WALKING ON THE LEVEL, UP A SLIGHT HILL OR AT NIGHT? When walking up hills, out of shape, obesity   5. NO - Do you currently have a cold, bronchitis or other respiratory infection?  7. NO - DO YOU SOMETIMES GET PAINS IN THE CALVES OF YOUR LEGS WHEN YOU WALK?   8. NO - Do you or anyone in your family have previous history of blood clots?  9. NO - Do you or does anyone in your family have a serious bleeding problem such as prolonged bleeding following surgeries or cuts?  10. YES - HAVE YOU EVER HAD PROBLEMS WITH ANEMIA OR BEEN TOLD TO TAKE IRON PILLS? When she was younger she took iron and was told she was anemic   11. NO - Have you had any abnormal blood loss such as black, tarry or bloody stools, or abnormal vaginal bleeding?  12. NO - Have you ever had a blood transfusion?  13. NO - Have you or any of  your relatives ever had problems with anesthesia?  14. NO - Do you have sleep apnea, excessive snoring or daytime drowsiness?  15. NO - Do you have any prosthetic heart valves?  16. NO - Do you have prosthetic joints?  17. NO - Is there any chance that you may be pregnant?      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 She has failed on conservative therapy including physical therapy. MRI imaging was obtained and showed herniated lumbar disc at S1. SAFIA recommended by neurosurgery. If fails on this, may need surgery.     See problem list for active medical problems.  Problems all longstanding and stable, except as noted/documented.  See ROS for pertinent symptoms related to these conditions.                                                                                                      MEDICAL HISTORY:     Patient Active Problem List    Diagnosis Date Noted     Lumbar radiculopathy 02/23/2018     Priority: Medium     Lumbar disc herniation with radiculopathy 02/23/2018     Priority: Medium     Acute bilateral low back pain without sciatica 01/23/2018     Priority: Medium     Morbid obesity due to excess calories (H) 01/02/2017     Priority: Medium     Morbid obesity with BMI of 40.0-44.9, adult (H) 06/20/2014     Priority: Medium     Vitamin D deficiency 06/20/2014     Priority: Medium     PCOS (polycystic ovarian syndrome) 06/01/2011     Priority: Medium      No past medical history on file.     Past Surgical History:   Procedure Laterality Date     WRIST SURGERY       Current Outpatient Prescriptions   Medication Sig Dispense Refill     oxyCODONE IR (ROXICODONE) 5 MG tablet Take 1 tablet (5 mg) by mouth every 6 hours as needed for pain 60 tablet 0     naproxen (NAPROSYN) 500 MG tablet Take 1 tablet (500 mg)  by mouth 2 times daily as needed for moderate pain 60 tablet 3     methocarbamol (ROBAXIN) 750 MG tablet Take 1 tablet (750 mg) by mouth 4 times daily as needed for muscle spasms 90 tablet 3     Acetaminophen (TYLENOL PO) Take 1,000 mg by mouth       IBUPROFEN PO Take 800 mg by mouth       OTC products: None, except as noted above    Allergies   Allergen Reactions     [place] Oil [Fish Oil] Swelling      Latex Allergy: NO    Social History   Substance Use Topics     Smoking status: Current Every Day Smoker     Packs/day: 0.50     Years: 9.00     Types: Cigarettes     Smokeless tobacco: Never Used      Comment: started age 12, officially  when she was 16     Alcohol use 0.0 oz/week     0 Standard drinks or equivalent per week      Comment: occasionally     History   Drug Use No       REVIEW OF SYSTEMS:   Constitutional, neuro, ENT, endocrine, pulmonary, cardiac, gastrointestinal, genitourinary, musculoskeletal, integument and psychiatric systems are negative, except as otherwise noted.    EXAM:   /84 (BP Location: Left arm, Patient Position: Chair, Cuff Size: Adult Large)  Pulse 107  Temp 97.7  F (36.5  C) (Oral)  Resp 20  Wt (!) 328 lb (148.8 kg)  LMP 02/27/2018  SpO2 99%  BMI 45.75 kg/m2    GENERAL APPEARANCE: healthy, alert and no distress     EYES: EOMI, PERRL     HENT: ear canals and TM's normal and nose and mouth without ulcers or lesions     NECK: no adenopathy, no asymmetry, masses, or scars and thyroid normal to palpation     RESP: lungs clear to auscultation - no rales, rhonchi or wheezes     CV: regular rates and rhythm, normal S1 S2, no S3 or S4 and no murmur, click or rub     MS: extremities normal- no gross deformities noted, no evidence of inflammation in joints, FROM in all extremities.     SKIN: no suspicious lesions or rashes     NEURO: Normal strength and tone, sensory exam grossly normal, mentation intact and speech normal     PSYCH: mentation appears normal. and affect normal/bright      LYMPHATICS: No cervical adenopathy    DIAGNOSTICS:     EKG: Not indicated due to non-vascular surgery and low risk of event (age <65 and without cardiac risk factors)    Labs Resulted Today:   Results for orders placed or performed during the hospital encounter of 02/19/18   MR Lumbar Spine w/o Contrast    Narrative    MR LUMBAR SPINE WITHOUT CONTRAST  2/19/2018 10:45 AM     HISTORY:  Work injury over a month ago. No improvement with  conservative therapy. Acute bilateral low back pain without sciatica.  Low back and right leg pain.    TECHNIQUE:  Sagittal T1 and STIR. Sagittal T2 and axial dual-echo T2.     FINDINGS:  Five lumbar vertebrae are assumed. There is a  chronic-appearing left L5 pars defect (spondylolysis). No right L5  pars defect or anterolisthesis on this exam (supine position).     Findings by specific level:    There is facet degenerative change as follows: Minimal bilateral  L5-S1, minimal right L4-L5.    T12-L1: No disc herniation or stenosis.    L1-L2: No disc herniation or significant stenosis.    L2-L3: No disc herniation or significant stenosis.    L3-L4: No disc herniation or significant overall central stenosis. No  left foraminal stenosis. Mild right foraminal stenosis. There is a 0.5  cm cystic-appearing lesion within the right ligamentum flavum, without  significant mass effect on the thecal sac.    L4-L5: No disc herniation or central stenosis. Mild bilateral  foraminal stenosis.    L5-S1: There is a moderate disc protrusion extending from right  parasagittal to medial foraminal. Moderate right foraminal stenosis  medially. No central or left foraminal stenosis. Disc material abuts  the right S1 nerve root.      Impression    IMPRESSION: L5-S1: Left L5 pars defect. Moderate right disc protrusion  which abuts the S1 nerve root. Moderate right foraminal narrowing  medially.    YEVGENIY RODRIGUEZ MD     Labs Drawn and in Process:   Unresulted Labs Ordered in the Past 30 Days of this Admission      Date and Time Order Name Status Description    3/6/2018 1027 HEMOGLOBIN In process         Beta HCG - pending         IMPRESSION:   Reason for surgery/procedure: acute bilateral low back pain and injury with disc herniation and radiculopathy  Diagnosis/reason for consult: Pre operative consult     The proposed surgical procedure is considered INTERMEDIATE risk.    REVISED CARDIAC RISK INDEX  The patient has the following serious cardiovascular risks for perioperative complications such as (MI, PE, VFib and 3  AV Block):  No serious cardiac risks  INTERPRETATION: 0 risks: Class I (very low risk - 0.4% complication rate)    The patient has the following additional risks for perioperative complications:  No identified additional risks      ICD-10-CM    1. Preop general physical exam Z01.818 Hemoglobin     Beta HCG Qual, Urine - [facility] and [place] ([id number])   2. Acute bilateral low back pain without sciatica M54.5    3. Lumbar disc herniation with radiculopathy M51.16    4. Lumbar radiculopathy M54.16    5. Morbid obesity due to excess calories (H) E66.01        RECOMMENDATIONS:   --Patient is to take all scheduled medications on the day of surgery EXCEPT for modifications listed below.    Anticoagulant or Antiplatelet Medication Use  NSAIDS: Naproxen (Naprosyn):   Stop 5 days prior to surgery    - Recheck pain and work restrictions in 2 weeks         APPROVAL GIVEN to proceed with proposed procedure, without further diagnostic evaluation     Patient [facility] and [facility]         Signed Electronically by: Lorraine Hopkins PA-C    Copy of this evaluation report is provided to requesting physician.    [place] Preop Guidelines

## 2018-02-27 NOTE — PATIENT INSTRUCTIONS
- Hold all NSAIDs 5 days prior to procedure     OK to start day after     - Call neurosurgery if symptoms continue after injection     - Recheck with PCP in 2 weeks for pain recheck       Before Your Surgery      Call your surgeon if there is any change in your health. This includes signs of a cold or flu (such as a sore throat, runny nose, cough, rash or fever).    Do not smoke, drink alcohol or take over the counter medicine (unless your surgeon or primary care doctor tells you to) for the 24 hours before and after surgery.    If you take prescribed drugs: Follow your doctor s orders about which medicines to take and which to stop until after surgery.    Eating and drinking prior to surgery: follow the instructions from your surgeon    Take a shower or bath the night before surgery. Use the soap your surgeon gave you to gently clean your skin. If you do not have soap from your surgeon, use your regular soap. Do not shave or scrub the surgery site.  Wear clean pajamas and have clean sheets on your bed.

## 2018-03-06 ENCOUNTER — OFFICE VISIT (OUTPATIENT)
Dept: FAMILY MEDICINE | Facility: OTHER | Age: 23
End: 2018-03-06
Payer: COMMERCIAL

## 2018-03-06 VITALS
TEMPERATURE: 97.7 F | HEART RATE: 107 BPM | BODY MASS INDEX: 45.75 KG/M2 | RESPIRATION RATE: 20 BRPM | WEIGHT: 293 LBS | SYSTOLIC BLOOD PRESSURE: 128 MMHG | DIASTOLIC BLOOD PRESSURE: 84 MMHG | OXYGEN SATURATION: 99 %

## 2018-03-06 DIAGNOSIS — E66.01 MORBID OBESITY DUE TO EXCESS CALORIES (H): ICD-10-CM

## 2018-03-06 DIAGNOSIS — Z01.818 PREOP GENERAL PHYSICAL EXAM: Primary | ICD-10-CM

## 2018-03-06 DIAGNOSIS — M51.16 LUMBAR DISC HERNIATION WITH RADICULOPATHY: ICD-10-CM

## 2018-03-06 DIAGNOSIS — M54.50 ACUTE BILATERAL LOW BACK PAIN WITHOUT SCIATICA: ICD-10-CM

## 2018-03-06 DIAGNOSIS — M54.16 LUMBAR RADICULOPATHY: ICD-10-CM

## 2018-03-06 LAB
BETA HCG QUAL IFA URINE: NEGATIVE
HGB BLD-MCNC: 14.8 G/DL (ref 11.7–15.7)

## 2018-03-06 PROCEDURE — 84703 CHORIONIC GONADOTROPIN ASSAY: CPT | Performed by: PHYSICIAN ASSISTANT

## 2018-03-06 PROCEDURE — 85018 HEMOGLOBIN: CPT | Performed by: PHYSICIAN ASSISTANT

## 2018-03-06 PROCEDURE — 36415 COLL VENOUS BLD VENIPUNCTURE: CPT | Performed by: PHYSICIAN ASSISTANT

## 2018-03-06 PROCEDURE — 99214 OFFICE O/P EST MOD 30 MIN: CPT | Performed by: PHYSICIAN ASSISTANT

## 2018-03-06 ASSESSMENT — PAIN SCALES - GENERAL: PAINLEVEL: SEVERE PAIN (7)

## 2018-03-06 NOTE — LETTER
[address]  [facility] [address]  Phone: [phone]    March 6, 2018        Linette Rosa  [address] [facility] [address]          To whom it may concern:    RE: Linette Rosa    Patient was seen and treated today at our clinic. Patient is scheduled for a procedure on Thursday, March 8th, 2018. I am recommending patient not work tomorrow (wednesday, 3/7/18) due to her pain and need for rest prior to her procedure. She should be excused from work 3/7/18 and 3/8/18.      Please contact me for questions or concerns.      Sincerely,              Lorraine Cho-SC[employer] [place]  March 6, 2018

## 2018-03-06 NOTE — MR AVS SNAPSHOT
After Visit Summary   3/6/2018    Linette Rosa    MRN: [id number]           Patient Information     Date Of Birth          1995        Visit Information        Provider Department      3/6/2018 10:15 AM Lorraine Hopkins PA-C [facility]        Today's Diagnoses     Preop general physical exam    -  1    Acute bilateral low back pain without sciatica        Lumbar disc herniation with radiculopathy        Lumbar radiculopathy        Morbid obesity due to excess calories (H)          Care Instructions      - Hold all NSAIDs 5 days prior to procedure     OK to start day after     - Call neurosurgery if symptoms continue after injection     - Recheck with PCP in 2 weeks for pain recheck       Before Your Surgery      Call your surgeon if there is any change in your health. This includes signs of a cold or flu (such as a sore throat, runny nose, cough, rash or fever).    Do not smoke, drink alcohol or take over the counter medicine (unless your surgeon or primary care doctor tells you to) for the 24 hours before and after surgery.    If you take prescribed drugs: Follow your doctor s orders about which medicines to take and which to stop until after surgery.    Eating and drinking prior to surgery: follow the instructions from your surgeon    Take a shower or bath the night before surgery. Use the soap your surgeon gave you to gently clean your skin. If you do not have soap from your surgeon, use your regular soap. Do not shave or scrub the surgery site.  Wear clean pajamas and have clean sheets on your bed.           Follow-ups after your visit        Follow-up notes from your care team     Return in about 2 weeks (around 3/20/2018) for Recheck.      Your next 10 appointments already scheduled     Mar 08, 2018   Procedure with Jerman Rudd MD   [facility] ([facility])    [address] [place] [address]   [phone]            From [address]: Exit at [employer] on south side of [place]. Turn right on [address]. Turn left at stoplight on [address]. [facility] will be in view two blocks ahead            Mar 08, 2018 12:30 PM CST   XR SURGERY JEFFERSON LESS THAN 5 MIN FLUORO W STILLS with [email or web address]   [facility] ([facility])    [address] [facility] [address]   [phone]           Please bring a list of your current medicines to your exam. (Include vitamins, minerals and over-thecounter medicines.) Leave your valuables at home.  Tell your doctor if there is a chance you may be pregnant.  You do not need to do anything special for this exam.            Mar 20, 2018 12:00 PM CDT   Office Visit with Lorraine Hopkins PA-C   [facility]    [address]  [facility] [id number] [phone]           Bring a current list of meds and any records pertaining to this visit. For Physicals, please bring immunization records and any forms needing to be filled out. Please arrive 10 minutes early to complete paperwork.              Who to contact     If you have questions or need follow up information about today's clinic visit or your schedule please contact [facility] directly at [phone].  Normal or non-critical lab and imaging results will be communicated to you by MyChart, letter or phone within 4 business days after the clinic has received the results. If you do not hear from us within 7 days, please contact the clinic through [employer] or phone. If you have a critical or abnormal lab result, we will notify you by phone as soon as possible.  Submit refill requests through [employer] or call your pharmacy and they will forward the refill request to us. Please allow 3 business days for your refill to be completed.          Additional Information About Your Visit        [employer] Information      "[employer] lets you send messages to your doctor, view your test results, renew your prescriptions, schedule appointments and more. To sign up, go to [email or web address] . Click on \"Log in\" on the left side of the screen, which will take you to the Welcome page. Then click on \"Sign up Now\" on the right side of the page.     You will be asked to enter the access code listed below, as well as some personal information. Please follow the directions to create your username and password.     Your access code is: [email or web address]  Expires: 2018  9:44 AM     Your access code will  in 90 days. If you need help or a new code, please call your [place] clinic or [phone].        Care EveryWhere ID     This is your Care EveryWhere ID. This could be used by other organizations to access your [place] medical records  [id number]        Your Vitals Were     Pulse Temperature Respirations Last Period Pulse Oximetry BMI (Body Mass Index)    107 97.7  F (36.5  C) (Oral) 20 2018 99% 45.75 kg/m2       Blood Pressure from Last 3 Encounters:   18 128/84   18 140/78   18 144/78    Weight from Last 3 Encounters:   18 (!) 328 lb (148.8 kg)   18 (!) 335 lb (152 kg)   18 (!) 335 lb (152 kg)              We Performed the Following     Beta HCG Qual, Urine - [facility] [place] ([id number])     Hemoglobin        Primary Care Provider Office Phone # Fax #    Lorraine YONATHAN Hopkins PA-C [phone]       [address] [address]        Equal Access to Services     GALE NASCIMENTO : Kerline Vo, guru fuchs, arcelia kaalmada amy, chandra saab. So [facility] [phone].    ATENCIÓN: Si habla español, tiene a oscar disposición servicios gratuitos de asistencia lingüística. Llame al [phone].    We comply with applicable federal civil rights laws and [place] laws. We do not discriminate on the basis of race, " color, national origin, age, disability, sex, sexual orientation, or gender identity.            Thank you!     Thank you for choosing [facility]  for your care. Our goal is always to provide you with excellent care. Hearing back from our patients is one way we can continue to improve our services. Please take a few minutes to complete the written survey that you may receive in the mail after your visit with us. Thank you!             Your Updated Medication List - Protect others around you: Learn how to safely use, store and throw away your medicines at www.disposemymeds.org.          This list is accurate as of 3/6/18 10:34 AM.  Always use your most recent med list.                   Brand Name Dispense Instructions for use Diagnosis    IBUPROFEN PO      Take 800 mg by mouth        methocarbamol 750 MG tablet    ROBAXIN    90 tablet    Take 1 tablet (750 mg) by mouth 4 times daily as needed for muscle spasms    Acute bilateral low back pain without sciatica, Back strain, subsequent encounter       naproxen 500 MG tablet    NAPROSYN    60 tablet    Take 1 tablet (500 mg) by mouth 2 times daily as needed for moderate pain    Back strain, subsequent encounter       oxyCODONE IR 5 MG tablet    ROXICODONE    60 tablet    Take 1 tablet (5 mg) by mouth every 6 hours as needed for pain    Acute bilateral low back pain without sciatica       TYLENOL PO      Take 1,000 mg by mouth

## 2018-03-06 NOTE — NURSING NOTE
"Chief Complaint   Patient presents with     Pre-Op Exam     Panel Management     mychart, tdap, hpv, flu, chlamydia       Initial /84 (BP Location: Left arm, Patient Position: Chair, Cuff Size: Adult Large)  Pulse 107  Temp 97.7  F (36.5  C) (Oral)  Resp 20  Wt (!) 328 lb (148.8 kg)  LMP 02/27/2018  SpO2 99%  BMI 45.75 kg/m2 Estimated body mass index is 45.75 kg/(m^2) as calculated from the following:    Height as of 2/22/18: 5' 11\" (1.803 m).    Weight as of this encounter: 328 lb (148.8 kg).  Medication Reconciliation: complete  "

## 2018-03-06 NOTE — PROGRESS NOTES
Please call patient with the following message    Pregnancy test negative. Hemoglobin normal. OK to proceed with her procedure on Thursday.     Misael Hopkins PA-C

## 2018-03-07 ENCOUNTER — ANESTHESIA EVENT (OUTPATIENT)
Dept: SURGERY | Facility: CLINIC | Age: 23
End: 2018-03-07
Payer: OTHER MISCELLANEOUS

## 2018-03-07 ASSESSMENT — LIFESTYLE VARIABLES: TOBACCO_USE: 1

## 2018-03-07 NOTE — H&P (VIEW-ONLY)
[address]  [facility] [address]  [phone]  Dept: [phone]    Ok for student    PRE-OP EVALUATION:  Today's date: 3/6/2018    Linette Rosa (: 1995) presents for pre-operative evaluation assessment as requested by Dr. Rudd (Isha Setwart PA-C).  She requires evaluation and anesthesia risk assessment prior to undergoing surgery/procedure for treatment of acute bilateral low back pain and injury with disc herniation and radiculopathy.    Proposed Surgery/ Procedure: INJECT EPIDURAL TRANSFORAMINAL  Date of Surgery/ Procedure: 3/8/18  Time of Surgery/ Procedure: 12:30  Hospital/Surgical Facility: [facility]    Primary Physician: Lorraine Hopkins  Type of Anesthesia Anticipated: to be determined    Patient has a Health Care Directive or Living Will:  NO    1. NO - Do you have a history of heart attack, stroke, stent, bypass or surgery on an artery in the head, neck, heart or legs?  2. NO - Do you ever have any pain or discomfort in your chest?  3. NO - Do you have a history of  Heart Failure?  4. YES - ARE YOUR TROUBLED BY SHORTNESS OF BREATH WHEN WALKING ON THE LEVEL, UP A SLIGHT HILL OR AT NIGHT? When walking up hills, out of shape, obesity   5. NO - Do you currently have a cold, bronchitis or other respiratory infection?  7. NO - DO YOU SOMETIMES GET PAINS IN THE CALVES OF YOUR LEGS WHEN YOU WALK?   8. NO - Do you or anyone in your family have previous history of blood clots?  9. NO - Do you or does anyone in your family have a serious bleeding problem such as prolonged bleeding following surgeries or cuts?  10. YES - HAVE YOU EVER HAD PROBLEMS WITH ANEMIA OR BEEN TOLD TO TAKE IRON PILLS? When she was younger she took iron and was told she was anemic   11. NO - Have you had any abnormal blood loss such as black, tarry or bloody stools, or abnormal vaginal bleeding?  12. NO - Have you ever had a blood transfusion?  13. NO - Have you or any of  your relatives ever had problems with anesthesia?  14. NO - Do you have sleep apnea, excessive snoring or daytime drowsiness?  15. NO - Do you have any prosthetic heart valves?  16. NO - Do you have prosthetic joints?  17. NO - Is there any chance that you may be pregnant?      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 She has failed on conservative therapy including physical therapy. MRI imaging was obtained and showed herniated lumbar disc at S1. SAFIA recommended by neurosurgery. If fails on this, may need surgery.     See problem list for active medical problems.  Problems all longstanding and stable, except as noted/documented.  See ROS for pertinent symptoms related to these conditions.                                                                                                      MEDICAL HISTORY:     Patient Active Problem List    Diagnosis Date Noted     Lumbar radiculopathy 02/23/2018     Priority: Medium     Lumbar disc herniation with radiculopathy 02/23/2018     Priority: Medium     Acute bilateral low back pain without sciatica 01/23/2018     Priority: Medium     Morbid obesity due to excess calories (H) 01/02/2017     Priority: Medium     Morbid obesity with BMI of 40.0-44.9, adult (H) 06/20/2014     Priority: Medium     Vitamin D deficiency 06/20/2014     Priority: Medium     PCOS (polycystic ovarian syndrome) 06/01/2011     Priority: Medium      No past medical history on file.     Past Surgical History:   Procedure Laterality Date     WRIST SURGERY       Current Outpatient Prescriptions   Medication Sig Dispense Refill     oxyCODONE IR (ROXICODONE) 5 MG tablet Take 1 tablet (5 mg) by mouth every 6 hours as needed for pain 60 tablet 0     naproxen (NAPROSYN) 500 MG tablet Take 1 tablet (500 mg)  by mouth 2 times daily as needed for moderate pain 60 tablet 3     methocarbamol (ROBAXIN) 750 MG tablet Take 1 tablet (750 mg) by mouth 4 times daily as needed for muscle spasms 90 tablet 3     Acetaminophen (TYLENOL PO) Take 1,000 mg by mouth       IBUPROFEN PO Take 800 mg by mouth       OTC products: None, except as noted above    Allergies   Allergen Reactions     [place] Oil [Fish Oil] Swelling      Latex Allergy: NO    Social History   Substance Use Topics     Smoking status: Current Every Day Smoker     Packs/day: 0.50     Years: 9.00     Types: Cigarettes     Smokeless tobacco: Never Used      Comment: started age 12, officially  when she was 16     Alcohol use 0.0 oz/week     0 Standard drinks or equivalent per week      Comment: occasionally     History   Drug Use No       REVIEW OF SYSTEMS:   Constitutional, neuro, ENT, endocrine, pulmonary, cardiac, gastrointestinal, genitourinary, musculoskeletal, integument and psychiatric systems are negative, except as otherwise noted.    EXAM:   /84 (BP Location: Left arm, Patient Position: Chair, Cuff Size: Adult Large)  Pulse 107  Temp 97.7  F (36.5  C) (Oral)  Resp 20  Wt (!) 328 lb (148.8 kg)  LMP 02/27/2018  SpO2 99%  BMI 45.75 kg/m2    GENERAL APPEARANCE: healthy, alert and no distress     EYES: EOMI, PERRL     HENT: ear canals and TM's normal and nose and mouth without ulcers or lesions     NECK: no adenopathy, no asymmetry, masses, or scars and thyroid normal to palpation     RESP: lungs clear to auscultation - no rales, rhonchi or wheezes     CV: regular rates and rhythm, normal S1 S2, no S3 or S4 and no murmur, click or rub     MS: extremities normal- no gross deformities noted, no evidence of inflammation in joints, FROM in all extremities.     SKIN: no suspicious lesions or rashes     NEURO: Normal strength and tone, sensory exam grossly normal, mentation intact and speech normal     PSYCH: mentation appears normal. and affect normal/bright      LYMPHATICS: No cervical adenopathy    DIAGNOSTICS:     EKG: Not indicated due to non-vascular surgery and low risk of event (age <65 and without cardiac risk factors)    Labs Resulted Today:   Results for orders placed or performed during the hospital encounter of 02/19/18   MR Lumbar Spine w/o Contrast    Narrative    MR LUMBAR SPINE WITHOUT CONTRAST  2/19/2018 10:45 AM     HISTORY:  Work injury over a month ago. No improvement with  conservative therapy. Acute bilateral low back pain without sciatica.  Low back and right leg pain.    TECHNIQUE:  Sagittal T1 and STIR. Sagittal T2 and axial dual-echo T2.     FINDINGS:  Five lumbar vertebrae are assumed. There is a  chronic-appearing left L5 pars defect (spondylolysis). No right L5  pars defect or anterolisthesis on this exam (supine position).     Findings by specific level:    There is facet degenerative change as follows: Minimal bilateral  L5-S1, minimal right L4-L5.    T12-L1: No disc herniation or stenosis.    L1-L2: No disc herniation or significant stenosis.    L2-L3: No disc herniation or significant stenosis.    L3-L4: No disc herniation or significant overall central stenosis. No  left foraminal stenosis. Mild right foraminal stenosis. There is a 0.5  cm cystic-appearing lesion within the right ligamentum flavum, without  significant mass effect on the thecal sac.    L4-L5: No disc herniation or central stenosis. Mild bilateral  foraminal stenosis.    L5-S1: There is a moderate disc protrusion extending from right  parasagittal to medial foraminal. Moderate right foraminal stenosis  medially. No central or left foraminal stenosis. Disc material abuts  the right S1 nerve root.      Impression    IMPRESSION: L5-S1: Left L5 pars defect. Moderate right disc protrusion  which abuts the S1 nerve root. Moderate right foraminal narrowing  medially.    YEVGENIY RODRIGUEZ MD     Labs Drawn and in Process:   Unresulted Labs Ordered in the Past 30 Days of this Admission      Date and Time Order Name Status Description    3/6/2018 1027 HEMOGLOBIN In process         Beta HCG - pending         IMPRESSION:   Reason for surgery/procedure: acute bilateral low back pain and injury with disc herniation and radiculopathy  Diagnosis/reason for consult: Pre operative consult     The proposed surgical procedure is considered INTERMEDIATE risk.    REVISED CARDIAC RISK INDEX  The patient has the following serious cardiovascular risks for perioperative complications such as (MI, PE, VFib and 3  AV Block):  No serious cardiac risks  INTERPRETATION: 0 risks: Class I (very low risk - 0.4% complication rate)    The patient has the following additional risks for perioperative complications:  No identified additional risks      ICD-10-CM    1. Preop general physical exam Z01.818 Hemoglobin     Beta HCG Qual, Urine - [facility] [place] ([id number])   2. Acute bilateral low back pain without sciatica M54.5    3. Lumbar disc herniation with radiculopathy M51.16    4. Lumbar radiculopathy M54.16    5. Morbid obesity due to excess calories (H) E66.01        RECOMMENDATIONS:   --Patient is to take all scheduled medications on the day of surgery EXCEPT for modifications listed below.    Anticoagulant or Antiplatelet Medication Use  NSAIDS: Naproxen (Naprosyn):   Stop 5 days prior to surgery    - Recheck pain and work restrictions in 2 weeks         APPROVAL GIVEN to proceed with proposed procedure, without further diagnostic evaluation     Patient [facility] and [facility]         Signed Electronically by: Lorraine Hopkins PA-C    Copy of this evaluation report is provided to requesting physician.    [place] Preop Guidelines

## 2018-03-08 ENCOUNTER — ANESTHESIA (OUTPATIENT)
Dept: SURGERY | Facility: CLINIC | Age: 23
End: 2018-03-08
Payer: OTHER MISCELLANEOUS

## 2018-03-08 ENCOUNTER — HOSPITAL ENCOUNTER (OUTPATIENT)
Facility: CLINIC | Age: 23
Discharge: HOME | End: 2018-03-08
Attending: ANESTHESIOLOGY | Admitting: ANESTHESIOLOGY
Payer: OTHER MISCELLANEOUS

## 2018-03-08 ENCOUNTER — HOSPITAL ENCOUNTER (OUTPATIENT)
Dept: GENERAL RADIOLOGY | Facility: CLINIC | Age: 23
Discharge: HOME | End: 2018-03-08
Attending: ANESTHESIOLOGY | Admitting: ANESTHESIOLOGY
Payer: COMMERCIAL

## 2018-03-08 VITALS
OXYGEN SATURATION: 96 % | TEMPERATURE: 97.6 F | RESPIRATION RATE: 18 BRPM | SYSTOLIC BLOOD PRESSURE: 156 MMHG | DIASTOLIC BLOOD PRESSURE: 95 MMHG | HEART RATE: 89 BPM

## 2018-03-08 DIAGNOSIS — M54.16 LUMBAR RADICULOPATHY: ICD-10-CM

## 2018-03-08 PROCEDURE — 25000128 H RX IP 250 OP 636: Performed by: NURSE ANESTHETIST, CERTIFIED REGISTERED

## 2018-03-08 PROCEDURE — 25000128 H RX IP 250 OP 636: Performed by: ANESTHESIOLOGY

## 2018-03-08 PROCEDURE — 37000008 ZZH ANESTHESIA TECHNICAL FEE, 1ST 30 MIN: Performed by: ANESTHESIOLOGY

## 2018-03-08 PROCEDURE — 64483 NJX AA&/STRD TFRM EPI L/S 1: CPT | Mod: RT | Performed by: ANESTHESIOLOGY

## 2018-03-08 PROCEDURE — 40000277 XR SURGERY CARM FLUORO LESS THAN 5 MIN W STILLS: Mod: TC

## 2018-03-08 RX ORDER — SODIUM CHLORIDE, SODIUM LACTATE, POTASSIUM CHLORIDE, CALCIUM CHLORIDE 600; 310; 30; 20 MG/100ML; MG/100ML; MG/100ML; MG/100ML
INJECTION, SOLUTION INTRAVENOUS CONTINUOUS
Status: DISCONTINUED | OUTPATIENT
Start: 2018-03-08 | End: 2018-03-08 | Stop reason: HOSPADM

## 2018-03-08 RX ORDER — LIDOCAINE 40 MG/G
CREAM TOPICAL
Status: DISCONTINUED | OUTPATIENT
Start: 2018-03-08 | End: 2018-03-08 | Stop reason: HOSPADM

## 2018-03-08 RX ORDER — TRIAMCINOLONE ACETONIDE 40 MG/ML
INJECTION, SUSPENSION INTRA-ARTICULAR; INTRAMUSCULAR PRN
Status: DISCONTINUED | OUTPATIENT
Start: 2018-03-08 | End: 2018-03-08 | Stop reason: HOSPADM

## 2018-03-08 RX ORDER — NALOXONE HYDROCHLORIDE 0.4 MG/ML
.1-.4 INJECTION, SOLUTION INTRAMUSCULAR; INTRAVENOUS; SUBCUTANEOUS
Status: DISCONTINUED | OUTPATIENT
Start: 2018-03-08 | End: 2018-03-08 | Stop reason: HOSPADM

## 2018-03-08 RX ORDER — PROPOFOL 10 MG/ML
INJECTION, EMULSION INTRAVENOUS PRN
Status: DISCONTINUED | OUTPATIENT
Start: 2018-03-08 | End: 2018-03-08

## 2018-03-08 RX ORDER — MEPERIDINE HYDROCHLORIDE 25 MG/ML
12.5 INJECTION INTRAMUSCULAR; INTRAVENOUS; SUBCUTANEOUS
Status: DISCONTINUED | OUTPATIENT
Start: 2018-03-08 | End: 2018-03-08 | Stop reason: HOSPADM

## 2018-03-08 RX ORDER — ONDANSETRON 4 MG/1
4 TABLET, ORALLY DISINTEGRATING ORAL EVERY 30 MIN PRN
Status: DISCONTINUED | OUTPATIENT
Start: 2018-03-08 | End: 2018-03-08 | Stop reason: HOSPADM

## 2018-03-08 RX ORDER — ONDANSETRON 2 MG/ML
4 INJECTION INTRAMUSCULAR; INTRAVENOUS EVERY 30 MIN PRN
Status: DISCONTINUED | OUTPATIENT
Start: 2018-03-08 | End: 2018-03-08 | Stop reason: HOSPADM

## 2018-03-08 RX ORDER — IOPAMIDOL 612 MG/ML
INJECTION, SOLUTION INTRATHECAL PRN
Status: DISCONTINUED | OUTPATIENT
Start: 2018-03-08 | End: 2018-03-08 | Stop reason: HOSPADM

## 2018-03-08 RX ADMIN — PROPOFOL 50 MG: 10 INJECTION, EMULSION INTRAVENOUS at 12:15

## 2018-03-08 NOTE — ANESTHESIA PREPROCEDURE EVALUATION
Anesthesia Evaluation     . Pt has had prior anesthetic. Type: General    No history of anesthetic complications          ROS/MED HX    ENT/Pulmonary:     (+)NORRIS risk factors snores loudly, obese, tobacco use, Current use .5ppd packs/day  , . .    Neurologic:  - neg neurologic ROS     Cardiovascular:  - neg cardiovascular ROS       METS/Exercise Tolerance:     Hematologic:  - neg hematologic  ROS       Musculoskeletal:  - neg musculoskeletal ROS       GI/Hepatic:  - neg GI/hepatic ROS      (-) GERD   Renal/Genitourinary:  - ROS Renal section negative       Endo:  - neg endo ROS   (+) Obesity, .      Psychiatric:  - neg psychiatric ROS       Infectious Disease:  - neg infectious disease ROS       Malignancy:      - no malignancy   Other:    (+) Possibly pregnant LMP: 2-, C-spine cleared: N/A, H/O Chronic Pain,H/O chronic opiod use , no other significant disability   - neg other ROS                 Physical Exam  Normal systems: cardiovascular, pulmonary and dental    Airway   Mallampati: II  TM distance: <3 FB  Neck ROM: full    Dental     Cardiovascular   Rhythm and rate: regular and normal      Pulmonary    breath sounds clear to auscultation                    Anesthesia Plan      History & Physical Review  History and physical reviewed and following examination; no interval change.    ASA Status:  2 .    NPO Status:  > 8 hours    Plan for MAC with Intravenous and Propofol induction. Maintenance will be TIVA.  Reason for MAC:  Deep or markedly invasive procedure (G8)  PONV prophylaxis:  Ondansetron (or other 5HT-3)       Postoperative Care  Postoperative pain management:  IV analgesics.      Consents  Anesthetic plan, risks, benefits and alternatives discussed with:  Patient.  Use of blood products discussed: No .   .                          .

## 2018-03-08 NOTE — OP NOTE
PRIMARY PROBLEM: Low back pain and right leg pain in the right S1 dermatome    PROCEDURE: Right S1  Transforaminal Epidural Steroid Injection with fluoroscopic guidance and contrast.     PROCEDURE DETAILS: After written informed consent was obtained from the patient, the patient was escorted to the procedure room.  The patient was placed in the prone position.  A  time out  was conducted to verify patient identity, procedure to be performed, side, site, allergies and any special requirements.  The skin over the thoracolumbar region was prepped and draped in normal sterile fashion. Fluoroscopy was used to identify the neural foramen in AP view and the skin was anesthetized with 2 mL of 1% lidocaine with bicarbonate buffer. A 22-gauge Quincke spinal needle was advanced through this location and advanced under fluoroscopic guidance towards the S1 neural foramen.  The needle was walked past the sacral border in the lateral fluoroscopic view.  While still in a lateral view, the needle was slowly advanced to avoid injury to the spinal nerve.  Then, in the oblique view (approximately 28 degrees), after negative aspiration, 1.5 mL of Omnipaque contrast dye was injected revealing epidural spread without evidence of intravascular or intrathecal spread.  Then a 3cc solution of 40 mg of Triamcinolone in 2 mL of  Preservative-Free saline was slowly injected into the epidural space at each segment.  After injection of the medication, as the needle tip was withdrawn, it was flushed with local anesthetic.   The patient was monitored with blood pressure and pulse oximetry machines with the assistance of an RN throughout the procedure.  The patient was alert and responsive to questions throughout the procedure.   The patient tolerated the procedure well and was observed in the post-procedural area.  The patient was dismissed without apparent complications.     DIAGNOSIS:  1.  Right S1 radiculopathy secondary to an L5-S1 disc  extrusion causing compression of the S1 nerve root    PLAN:  1. Performed a right S1  transforaminal epidural steroid injection.  2. The patient was instructed to follow-up per Dr. Rudd's instructions.      Jerman Rudd MD  Diplomate of the American Board of Anesthesiology, Pain Medicine

## 2018-03-08 NOTE — ANESTHESIA CARE TRANSFER NOTE
Patient: Linette Rosa    Procedure(s):  right transforaminal sacral 1 epidural steroid injection - Wound Class: I-Clean    Diagnosis: lumbar radiculopathy  Diagnosis Additional Information: No value filed.    Anesthesia Type:   MAC     Note:  Airway :Room Air  Patient transferred to:Phase II  Handoff Report: Identifed the Patient, Identified the Reponsible Provider, Reviewed the pertinent medical history, Discussed the surgical course, Reviewed Intra-OP anesthesia mangement and issues during anesthesia, Set expectations for post-procedure period and Allowed opportunity for questions and acknowledgement of understanding      Vitals: (Last set prior to Anesthesia Care Transfer)    CRNA VITALS  3/8/2018 1151 - 3/8/2018 1229      3/8/2018             Pulse: 83    SpO2: 98 %    Resp Rate (observed): 18                Electronically Signed By: VINEET Khan CRNA  March 8, 2018  12:29 PM

## 2018-03-08 NOTE — DISCHARGE INSTRUCTIONS
Home Care Instructions                Procedure:  Epidural Steroid Injection or Joint injection    Activity:    Rest today    Do not work today    Resume normal activity tomorrow    Pain:    You may experience soreness at the injection site for one or two days    You may use an ice pack for 20 minutes every 2 hours for the first 24 hours    You may use a heating pad after the first 24 hours    You may use Tylenol  (acetaminophen) every 4 hours or other pain medicines as directed by your physician    Safety  Sedation medicine, if given may remain active for many hours.    It is important for the next 24 hours that you do not:    Drive a car    Operate machines or power tools    Consume alcohol, including beer    Sign any important papers or legal documents    You may experience numbness radiating into your legs or arms, (depending on the procedure location)  This numbness may last several hours.  Until the numb sensation returns to normal please use caution in walking, climbing stairs, stepping out of your vehicle, etc.    Common side effects of steroids:  Not everyone will experience corticosteroid side effects. If side effects are experienced they will gradually subside in the 7-10 day period following an injection.    Most common side effects include:    Flushed face and/or chest    Feeling of warmth, particularly in face but could be overall feeling of warmth    Increased blood sugar in diabetic patients    Menstrual irregularities may occur.  If taking hormone based birth control an alternate method of birth control is recommended    Sleep disturbances and/or mood swings are possible    Leg cramps    Please contact us if you have:  Severe pain   Fever more than 101.5 degrees Fahrenheit  Signs of infection (redness, swelling or drainage)      After office hours you can contact the on call provider by dialing [phone].  If you need immediate attention we recommend that you go to a hospital emergency room or  dial 911.

## 2018-03-08 NOTE — ANESTHESIA POSTPROCEDURE EVALUATION
Patient: Linette Rosa    Procedure(s):  right transforaminal sacral 1 epidural steroid injection - Wound Class: I-Clean    Diagnosis:lumbar radiculopathy  Diagnosis Additional Information: No value filed.    Anesthesia Type:  MAC    Note:  Anesthesia Post Evaluation    Patient location during evaluation: Phase 2 and Bedside  Patient participation: Able to fully participate in evaluation  Level of consciousness: awake and alert  Pain management: adequate  Airway patency: patent  Cardiovascular status: acceptable  Respiratory status: acceptable  Hydration status: acceptable  PONV: none     Anesthetic complications: None    Comments: Pt was pleased with her care today. No complications noted. VSS. DC to home with father.        Last vitals:  Vitals:    03/08/18 1140 03/08/18 1222   BP: (!) 132/97 (!) 139/100   Pulse: 89    Resp: 18 18   Temp: 97.6  F (36.4  C)    SpO2: 97% 96%         Electronically Signed By: VINEET Khan CRNA  March 8, 2018  12:27 PM

## 2018-03-08 NOTE — IP AVS SNAPSHOT
[facility] Phase II    [address]     [place] [address]    Phone:  [phone]                                       After Visit Summary   3/8/2018    Linette Rosa    MRN: [id number]           After Visit Summary Signature Page     I have received my discharge instructions, and my questions have been answered. I have discussed any challenges I see with this plan with the nurse or doctor.    ..........................................................................................................................................  Patient/Patient Representative Signature      ..........................................................................................................................................  Patient Representative Print Name and Relationship to Patient    ..................................................               ................................................  Date                                            Time    ..........................................................................................................................................  Reviewed by Signature/Title    ...................................................              ..............................................  Date                                                            Time

## 2018-03-08 NOTE — IP AVS SNAPSHOT
MRN:[id number]                      After Visit Summary   3/8/2018    Linette Rosa    MRN: [id number]           Thank you!     Thank you for choosing [facility] for your care. Our goal is always to provide you with excellent care. Hearing back from our patients is one way we can continue to improve our services. Please take a few minutes to complete the written survey that you may receive in the mail after you visit with us. Thank you!        Patient Information     Date Of Birth          1995        About your hospital stay     You were admitted on:  March 8, 2018 You last received care in the:  [facility] Phase II    You were discharged on:  March 8, 2018       Who to Call     For medical emergencies, please call 911.  For non-urgent questions about your medical care, please call your primary care provider or clinic, [phone]  For questions related to your surgery, please call your surgery clinic        Attending Provider     Provider Specialty    [facility]       Primary Care Provider Office Phone # Fax #    Lorraine YONATHAN Hopkins PA-C [phone]      After Care Instructions     Discharge Instructions       Review outpatient procedure discharge instructions as directed by Provider.                  Your next 10 appointments already scheduled     Mar 08, 2018   Procedure with Jerman Rudd MD   [facility] Periop Services ([facility])    [address] [place] [address]  [place] [id number] [phone]           From [address]: Exit at [address] on [address] side of [place]. Turn right on [address]. Turn left at stoplight on [address]. [facility] will be in view two blocks ahead            Mar 08, 2018 12:30 PM CST   XR SURGERY JEFFERSON LESS THAN 5 MIN FLUORO W STILLS with PHCARM1   [facility] ([facility])    [address] [place] [id number] [phone]            Please bring a list of your current medicines to your exam. (Include vitamins, minerals and over-thecounter medicines.) Leave your valuables at home.  Tell your doctor if there is a chance you may be pregnant.  You do not need to do anything special for this exam.            Mar 20, 2018 12:00 PM CDT   Office Visit with Lorraine Hopkins PA-C   [facility]    [address] [place] [address] [address]  [facility] [address] [phone]           Bring a current list of meds and any records pertaining to this visit. For Physicals, please bring immunization records and any forms needing to be filled out. Please arrive 10 minutes early to complete paperwork.              Further instructions from your care team       Home Care Instructions                Procedure:  Epidural Steroid Injection or Joint injection    Activity:    Rest today    Do not work today    Resume normal activity tomorrow    Pain:    You may experience soreness at the injection site for one or two days    You may use an ice pack for 20 minutes every 2 hours for the first 24 hours    You may use a heating pad after the first 24 hours    You may use Tylenol  (acetaminophen) every 4 hours or other pain medicines as directed by your physician    Safety  Sedation medicine, if given may remain active for many hours.    It is important for the next 24 hours that you do not:    Drive a car    Operate machines or power tools    Consume alcohol, including beer    Sign any important papers or legal documents    You may experience numbness radiating into your legs or arms, (depending on the procedure location)  This numbness may last several hours.  Until the numb sensation returns to normal please use caution in walking, climbing stairs, stepping out of your vehicle, etc.    Common side effects of steroids:  Not everyone will experience corticosteroid side effects. If side effects are experienced they will  "gradually subside in the 7-10 day period following an injection.    Most common side effects include:    Flushed face and/or chest    Feeling of warmth, particularly in face but could be overall feeling of warmth    Increased blood sugar in diabetic patients    Menstrual irregularities may occur.  If taking hormone based birth control an alternate method of birth control is recommended    Sleep disturbances and/or mood swings are possible    Leg cramps    Please contact us if you have:  Severe pain   Fever more than 101.5 degrees Fahrenheit  Signs of infection (redness, swelling or drainage)      After office hours you can contact the on call provider by dialing [phone].  If you need immediate attention we recommend that you go to a hospital emergency room or dial 911.                 Pending Results     No orders found from 3/6/2018 to 3/9/2018.            Admission Information     Date & Time Provider Department Dept. Phone    3/8/2018 Jerman Rudd MD [facility] Phase [phone]      Your Vitals Were     Blood Pressure Pulse Temperature Respirations Last Period Pulse Oximetry    139/100 89 97.6  F (36.4  C) (Oral) 18 2018 96%      MyChart Information     [facility] lets you send messages to your doctor, view your test results, renew your prescriptions, schedule appointments and more. To sign up, go to [email or web address] . Click on \"Log in\" on the left side of the screen, which will take you to the Welcome page. Then click on \"Sign up Now\" on the right side of the page.     You will be asked to enter the access code listed below, as well as some personal information. Please follow the directions to create your username and password.     Your access code is: [email or web address]  Expires: 2018  9:44 AM     Your access code will  in 90 days. If you need help or a new code, please call your [facility] clinic or [phone].        Care EveryWhere ID     This is your Care EveryWhere ID. " This could be used by other organizations to access your [facility] medical records  [id number]        Equal Access to Services     GALE NASCIMENTO : Hadii tonja Vo, wasonidoda josejasmeetha, qacathleenta kakristinmarie benjaminanastasiamarie, chandra hightowergilmarjorge saab. So [facility] [phone].    ATENCIÓN: Si habla español, tiene a oscar disposición servicios gratuitos de asistencia lingüística. Llame al [phone].    We comply with applicable federal civil rights laws and [place] laws. We do not discriminate on the basis of race, color, national origin, age, disability, sex, sexual orientation, or gender identity.               Review of your medicines      CONTINUE these medicines which have NOT CHANGED        Dose / Directions    IBUPROFEN PO        Dose:  800 mg   Take 800 mg by mouth   Refills:  0       methocarbamol 750 MG tablet   Commonly known as:  ROBAXIN   Used for:  Acute bilateral low back pain without sciatica, Back strain, subsequent encounter        Dose:  750 mg   Take 1 tablet (750 mg) by mouth 4 times daily as needed for muscle spasms   Quantity:  90 tablet   Refills:  3       naproxen 500 MG tablet   Commonly known as:  NAPROSYN   Used for:  Back strain, subsequent encounter        Dose:  500 mg   Take 1 tablet (500 mg) by mouth 2 times daily as needed for moderate pain   Quantity:  60 tablet   Refills:  3       oxyCODONE IR 5 MG tablet   Commonly known as:  ROXICODONE   Used for:  Acute bilateral low back pain without sciatica        Dose:  5 mg   Take 1 tablet (5 mg) by mouth every 6 hours as needed for pain   Quantity:  60 tablet   Refills:  0       TYLENOL PO        Dose:  1000 mg   Take 1,000 mg by mouth   Refills:  0                Protect others around you: Learn how to safely use, store and throw away your medicines at www.disposemymeds.org.             Medication List: This is a list of all your medications and when to take them. Check marks below indicate your daily home schedule. Keep this  list as a reference.      Medications           Morning Afternoon Evening Bedtime As Needed    IBUPROFEN PO   Take 800 mg by mouth                                methocarbamol 750 MG tablet   Commonly known as:  ROBAXIN   Take 1 tablet (750 mg) by mouth 4 times daily as needed for muscle spasms                                naproxen 500 MG tablet   Commonly known as:  NAPROSYN   Take 1 tablet (500 mg) by mouth 2 times daily as needed for moderate pain                                oxyCODONE IR 5 MG tablet   Commonly known as:  ROXICODONE   Take 1 tablet (5 mg) by mouth every 6 hours as needed for pain                                TYLENOL PO   Take 1,000 mg by mouth

## 2018-03-08 NOTE — ANESTHESIA CARE TRANSFER NOTE
Patient: Linette Rosa    Procedure(s):  right transforaminal sacral 1 epidural steroid injection - Wound Class: I-Clean    Diagnosis: lumbar radiculopathy  Diagnosis Additional Information: No value filed.    Anesthesia Type:   MAC     Note:  Airway :Room Air  Patient transferred to:Phase II  Handoff Report: Identifed the Patient, Identified the Reponsible Provider, Reviewed the pertinent medical history, Discussed the surgical course, Reviewed Intra-OP anesthesia mangement and issues during anesthesia, Set expectations for post-procedure period and Allowed opportunity for questions and acknowledgement of understanding      Vitals: (Last set prior to Anesthesia Care Transfer)    CRNA VITALS  3/8/2018 1151 - 3/8/2018 1227      3/8/2018             Pulse: 83    SpO2: 98 %    Resp Rate (observed): 18                Electronically Signed By: VINEET Khan CRNA  March 8, 2018  12:27 PM

## 2018-03-14 NOTE — PROGRESS NOTES
SUBJECTIVE:   Linette Rosa is a 22 year old female who presents to clinic today for the following health issues:      HPI  Back Pain Follow Up      Description:   Location of pain:  Center and right  Character of pain: dull ache and constant  Pain radiation: to right leg  Since last visit, pain is:  improved  New numbness or weakness in legs, not attributed to pain:  no     Intensity: Currently 4/10    History:   Pain interferes with job: YES, light duty  Therapies tried without relief: NSAIDs, opioids and steroid injection  Therapies tried with relief: NSAIDs, opioids and steroid injection     - Got injection 3/8/18   - Feeling much better, pain went from 8 to 4   - Feels ok to go back to work without restrictions   - Oxycodone, 2 before bed every night   - Takes naproxen twice a day  - Robaxin only at night   - If doesn't take pain pills and robaxin, wakes up sore   - Doing physical therapy stretches couple times a day       Problem list and histories reviewed & adjusted, as indicated.  Additional history: as documented    ROS:  Constitutional, HEENT, cardiovascular, pulmonary, gi and gu systems are negative, except as otherwise noted.    OBJECTIVE:   /74 (BP Location: Left arm, Patient Position: Chair, Cuff Size: Adult Large)  Pulse 96  Temp 98.1  F (36.7  C) (Temporal)  Resp 20  Wt (!) 329 lb 4 oz (149.3 kg)  LMP 02/27/2018  SpO2 98%  BMI 45.92 kg/m2  Body mass index is 45.92 kg/(m^2).  GENERAL APPEARANCE: healthy, alert and no distress  EYES: Eyes grossly normal to inspection, PERRLA, conjunctivae and sclerae without injection or discharge, EOM intact   MS: No musculoskeletal defects are noted and gait is age appropriate without ataxia   SKIN: No suspicious lesions or rashes, hydration status appears adeuqate with normal skin turgor   NEURO: Strength 5+ bilateral lower extremities, sensation intact in distal bilateral lower extremities, mentation- intact, speech- normal, reflexes- symmetric in  bilateral lower extremities  BACK: No CVA tenderness, slight paralumbar tenderness, no midline tenderness, slightly decreased ROM only with twisting, remainder normal   PSYCH: Alert and oriented x3; speech- coherent , normal rate and volume; able to articulate logical thoughts, able to abstract reason, no tangential thoughts, no hallucinations or delusions, mentation appears normal, Mood is euthymic. Affect is appropriate for this mood state and bright. Thought content is free of suicidal ideation, hallucinations, and delusions. Dress is adequate and upkept. Eye contact is good during conversation.       Diagnostic Test Results:  none     ASSESSMENT/PLAN:       ICD-10-CM    1. Acute bilateral low back pain without sciatica M54.5    2. Lumbar disc herniation with radiculopathy M51.16    3. Lumbar radiculopathy M54.16    4. Morbid obesity with BMI of 40.0-44.9, adult (H) E66.01 NUTRITION REFERRAL    Z68.41         - Work injury from 1/8/18, symptoms persisted after conservative therapy with physical therapy, MRI was obtained showing herniated disc at L5-S1, patient then consulted neurosurgery who recommended SAFIA procedure, this is scheduled for 3/81/18 (~2 weeks), was told if this fails within 2 weeks after injection, will need surgery     Patient reporting doing much better, in 2 days will be 2 weeks after injection    Feels ready to go back to work     - Continue with light duty at work for 2 more days, then cleared to return without restrictions   - Continue twice daily Naproxen 500 mg, reviewed use and side effects  - Robaxin helping mildly, will continue, discussed use and side effects, no taper off needed, only taking 1 before bed   - Oxycodone 5 mg, 2 before bed, still has plenty so no refill was given      Reviewed use and side effects including sedation and addiction, no driving on this medication  - To taper off - Robaxin first, then 1 tablet of oxycodone, then the other tablet, finally the Naproxen   -  Discussed needs to work on weight loss and exercise, continue to do 2x/daily back stretches/exercises as given by physical therapy, discussed a strong back will prevent this in the future (along with weight loss)   - Gave hand out on [employer] My Plate to help with health eating, also gave referral to nutrition   - Discussed small obtainable exercise goals and finding an exercise that she likes       The patient indicates understanding of these issues and agrees with the plan.    Follow up: PÉREZ Hopkins PA-C  [facility]

## 2018-03-20 ENCOUNTER — OFFICE VISIT (OUTPATIENT)
Dept: FAMILY MEDICINE | Facility: OTHER | Age: 23
End: 2018-03-20
Payer: COMMERCIAL

## 2018-03-20 VITALS
WEIGHT: 293 LBS | BODY MASS INDEX: 45.92 KG/M2 | OXYGEN SATURATION: 98 % | HEART RATE: 96 BPM | TEMPERATURE: 98.1 F | SYSTOLIC BLOOD PRESSURE: 128 MMHG | RESPIRATION RATE: 20 BRPM | DIASTOLIC BLOOD PRESSURE: 74 MMHG

## 2018-03-20 DIAGNOSIS — M51.16 LUMBAR DISC HERNIATION WITH RADICULOPATHY: ICD-10-CM

## 2018-03-20 DIAGNOSIS — E66.01 MORBID OBESITY WITH BMI OF 40.0-44.9, ADULT (H): ICD-10-CM

## 2018-03-20 DIAGNOSIS — M54.16 LUMBAR RADICULOPATHY: ICD-10-CM

## 2018-03-20 DIAGNOSIS — M54.50 ACUTE BILATERAL LOW BACK PAIN WITHOUT SCIATICA: Primary | ICD-10-CM

## 2018-03-20 PROCEDURE — 99214 OFFICE O/P EST MOD 30 MIN: CPT | Performed by: PHYSICIAN ASSISTANT

## 2018-03-20 ASSESSMENT — PAIN SCALES - GENERAL: PAINLEVEL: MODERATE PAIN (4)

## 2018-03-20 NOTE — PATIENT INSTRUCTIONS
- To taper off - Robaxin first, then 1 tablet of oxycodone, then the other tablet, finally the Naproxen

## 2018-03-20 NOTE — MR AVS SNAPSHOT
After Visit Summary   3/20/2018    Linette Rosa    MRN: [id number]           Patient Information     Date Of Birth          1995        Visit Information        Provider Department      3/20/2018 12:00 PM Lorraine Hopkins PA-C [facility]        Today's Diagnoses     Acute bilateral low back pain without sciatica    -  1    Lumbar disc herniation with radiculopathy        Lumbar radiculopathy        Morbid obesity with BMI of 40.0-44.9, adult (H)          Care Instructions    - To taper off - Robaxin first, then 1 tablet of oxycodone, then the other tablet, finally the Naproxen                 Follow-ups after your visit        Additional Services     NUTRITION REFERRAL       Your provider has referred you to: PREFERRED PROVIDERS:  FMG: [facility] [phone]   [email or web address]    Please be aware that coverage of these services is subject to the terms and limitations of your health insurance plan.  Call member services at your health plan with any benefit or coverage questions.      Please bring the following with you to your appointment:    (1) This referral request  (2) Any documents given to you regarding this referral  (3) Any specific questions you have about diet and/or food choices                  Who to contact     If you have questions or need follow up information about today's clinic visit or your schedule please contact [facility] directly at [phone].  Normal or non-critical lab and imaging results will be communicated to you by MyChart, letter or phone within 4 business days after the clinic has received the results. If you do not hear from us within 7 days, please contact the clinic through MyChart or phone. If you have a critical or abnormal lab result, we will notify you by phone as soon as possible.  Submit refill requests through [employer] or call your pharmacy and they  "will forward the refill request to us. Please allow 3 business days for your refill to be completed.          Additional Information About Your Visit        [employer] Information     [employer] lets you send messages to your doctor, view your test results, renew your prescriptions, schedule appointments and more. To sign up, go to [email or web address] . Click on \"Log in\" on the left side of the screen, which will take you to the Welcome page. Then click on \"Sign up Now\" on the right side of the page.     You will be asked to enter the access code listed below, as well as some personal information. Please follow the directions to create your username and password.     Your access code is: [email or web address]  Expires: 2018 10:44 AM     Your access code will  in 90 days. If you need help or a new code, please call your [place] clinic or [phone].        Care EveryWhere ID     This is your Care EveryWhere ID. This could be used by other organizations to access your [place] medical records  [id number]        Your Vitals Were     Pulse Temperature Respirations Last Period Pulse Oximetry BMI (Body Mass Index)    96 98.1  F (36.7  C) (Temporal) 20 2018 98% 45.92 kg/m2       Blood Pressure from Last 3 Encounters:   18 128/74   18 (!) 156/95   18 128/84    Weight from Last 3 Encounters:   18 (!) 329 lb 4 oz (149.3 kg)   18 (!) 328 lb (148.8 kg)   18 (!) 335 lb (152 kg)              We Performed the Following     NUTRITION REFERRAL        Primary Care Provider Office Phone # Fax #    Lorraine Hopkins PA-C [phone]       [address]  [facility] [address]        Equal Access to Services     GALE NASCIMENTO : Kerline Vo, guru fuchs, chandra perez . [facility] [phone].    ATENCIÓN: Si habla harshañol, tiene a oscar disposición servicios gratuitos de asistencia " lingüística. [place] al [phone].    We comply with applicable federal civil rights laws and [place] laws. We do not discriminate on the basis of race, color, national origin, age, disability, sex, sexual orientation, or gender identity.            Thank you!     Thank you for choosing [facility]  for your care. Our goal is always to provide you with excellent care. Hearing back from our patients is one way we can continue to improve our services. Please take a few minutes to complete the written survey that you may receive in the mail after your visit with us. Thank you!             Your Updated Medication List - Protect others around you: Learn how to safely use, store and throw away your medicines at www.disposemymeds.org.          This list is accurate as of 3/20/18 12:19 PM.  Always use your most recent med list.                   Brand Name Dispense Instructions for use Diagnosis    IBUPROFEN PO      Take 800 mg by mouth        methocarbamol 750 MG tablet    ROBAXIN    90 tablet    Take 1 tablet (750 mg) by mouth 4 times daily as needed for muscle spasms    Acute bilateral low back pain without sciatica, Back strain, subsequent encounter       naproxen 500 MG tablet    NAPROSYN    60 tablet    Take 1 tablet (500 mg) by mouth 2 times daily as needed for moderate pain    Back strain, subsequent encounter       oxyCODONE IR 5 MG tablet    ROXICODONE    60 tablet    Take 1 tablet (5 mg) by mouth every 6 hours as needed for pain    Acute bilateral low back pain without sciatica       TYLENOL PO      Take 1,000 mg by mouth

## 2018-03-20 NOTE — NURSING NOTE
"Chief Complaint   Patient presents with     Back Pain     Work Comp       Initial /74 (BP Location: Left arm, Patient Position: Chair, Cuff Size: Adult Large)  Pulse 96  Temp 98.1  F (36.7  C) (Temporal)  Resp 20  Wt (!) 329 lb 4 oz (149.3 kg)  LMP 02/27/2018  SpO2 98%  BMI 45.92 kg/m2 Estimated body mass index is 45.92 kg/(m^2) as calculated from the following:    Height as of 2/22/18: 5' 11\" (1.803 m).    Weight as of this encounter: 329 lb 4 oz (149.3 kg).  Medication Reconciliation: complete   Susan Morse CMA      "

## 2018-04-26 ENCOUNTER — TELEPHONE (OUTPATIENT)
Dept: FAMILY MEDICINE | Facility: OTHER | Age: 23
End: 2018-04-26

## 2018-04-26 NOTE — TELEPHONE ENCOUNTER
Reason for Call:  Form, our goal is to have forms completed with 72 hours, however, some forms may require a visit or additional information.    Type of letter, form or note:  [facility]     [facility] the [facility] (if other please explain)    Where did the form come from: form was faxed in    What clinic location was the form placed at?: [facility] - [phone]    Where the form was placed: 's Box    What number is listed as a contact on the form?: [phone]       Additional comments: none    Call taken on 4/26/2018 at 10:19 AM by Eleonora Whyte

## 2018-04-27 NOTE — TELEPHONE ENCOUNTER
Office note printed and faxed to [facility].  Divine Mclaughlin, [facility]

## 2018-05-30 ENCOUNTER — OFFICE VISIT (OUTPATIENT)
Dept: FAMILY MEDICINE | Facility: CLINIC | Age: 23
End: 2018-05-30
Payer: COMMERCIAL

## 2018-05-30 VITALS
HEART RATE: 100 BPM | TEMPERATURE: 98 F | OXYGEN SATURATION: 98 % | WEIGHT: 293 LBS | DIASTOLIC BLOOD PRESSURE: 70 MMHG | SYSTOLIC BLOOD PRESSURE: 128 MMHG | BODY MASS INDEX: 46.21 KG/M2

## 2018-05-30 DIAGNOSIS — L73.2 HIDRADENITIS AXILLARIS: Primary | ICD-10-CM

## 2018-05-30 PROCEDURE — 99213 OFFICE O/P EST LOW 20 MIN: CPT | Performed by: NURSE PRACTITIONER

## 2018-05-30 RX ORDER — CEPHALEXIN 500 MG/1
500 CAPSULE ORAL 3 TIMES DAILY
Qty: 30 CAPSULE | Refills: 0 | Status: SHIPPED | OUTPATIENT
Start: 2018-05-30

## 2018-05-30 ASSESSMENT — PAIN SCALES - GENERAL: PAINLEVEL: MODERATE PAIN (5)

## 2018-05-30 NOTE — MR AVS SNAPSHOT
After Visit Summary   5/30/2018    Linette Rosa    MRN: [id number]           Patient Information     Date Of Birth          1995        Visit Information        Provider Department      5/30/2018 3:45 PM Ericka Parker APRN CNP [facility]        Today's Diagnoses     Hidradenitis axillaris    -  1       Follow-ups after your visit        Who to contact     If you have questions or need follow up information about today's clinic visit or your schedule please contact [facility] directly at [phone].  Normal or non-critical lab and imaging results will be communicated to you by MyChart, letter or phone within 4 business days after the clinic has received the results. If you do not hear from us within 7 days, please contact the clinic through MyChart or phone. If you have a critical or abnormal lab result, we will notify you by phone as soon as possible.  Submit refill requests through [employer] or call your pharmacy and they will forward the refill request to us. Please allow 3 business days for your refill to be completed.          Additional Information About Your Visit        Care EveryWhere ID     This is your Care EveryWhere ID. This could be used by other organizations to access your [place] medical records  [id number]        Your Vitals Were     Pulse Temperature Last Period Pulse Oximetry BMI (Body Mass Index)       100 98  F (36.7  C) (Temporal) 05/01/2018 98% 46.21 kg/m2        Blood Pressure from Last 3 Encounters:   05/30/18 128/70   03/20/18 128/74   03/08/18 (!) 156/95    Weight from Last 3 Encounters:   05/30/18 (!) 331 lb 4.8 oz (150.3 kg)   03/20/18 (!) 329 lb 4 oz (149.3 kg)   03/06/18 (!) 328 lb (148.8 kg)              Today, you had the following     No orders found for display         Today's Medication Changes          These changes are accurate as of 5/30/18  4:39 PM.  If you have any questions, ask your nurse or doctor.                Start taking these medicines.        Dose/Directions    cephALEXin 500 MG capsule   Commonly known as:  KEFLEX   Used for:  Hidradenitis axillaris   Started by:  Ericka Parker APRN CNP        Dose:  500 mg   Take 1 capsule (500 mg) by mouth 3 times daily   Quantity:  30 capsule   Refills:  0            Where to get your medicines      These medications were sent to [facility], [place] - [address]   [address] , [facility] [address]     Phone:  [phone]     cephALEXin 500 MG capsule                Primary Care Provider Office Phone # Fax #    Lorraine YONATHAN Hopkins PA-C [phone]       [address]  [facility] [address]        Equal Access to Services     [facility] AZAM : Hadii aad ku hadasho Soleeannaali, waaxda luqadaha, qaybta kaalmada adeegyada, waxay lillianin alyse wiggins . [facility] [phone].    ATENCIÓN: Si habla español, tiene a oscar disposición servicios gratuitos de asistencia lingüística. [facility] [phone].    We comply with applicable federal civil rights laws and [place] laws. We do not discriminate on the basis of race, color, national origin, age, disability, sex, sexual orientation, or gender identity.            Thank you!     Thank you for choosing [facility]  for your care. Our goal is always to provide you with excellent care. Hearing back from our patients is one way we can continue to improve our services. Please take a few minutes to complete the written survey that you may receive in the mail after your visit with us. Thank you!             Your Updated Medication List - Protect others around you: Learn how to safely use, store and throw away your medicines at www.disposemymeds.org.          This list is accurate as of 5/30/18  4:39 PM.  Always use your most recent med list.                   Brand Name Dispense Instructions for use Diagnosis    cephALEXin 500 MG capsule    KEFLEX    30  capsule    Take 1 capsule (500 mg) by mouth 3 times daily    Hidradenitis axillaris       IBUPROFEN PO      Take 800 mg by mouth        TYLENOL PO      Take 1,000 mg by mouth

## 2018-10-28 ENCOUNTER — HOSPITAL ENCOUNTER (EMERGENCY)
Facility: CLINIC | Age: 23
Discharge: HOME | End: 2018-10-28
Attending: FAMILY MEDICINE | Admitting: FAMILY MEDICINE

## 2018-10-28 VITALS
HEART RATE: 80 BPM | SYSTOLIC BLOOD PRESSURE: 136 MMHG | DIASTOLIC BLOOD PRESSURE: 78 MMHG | RESPIRATION RATE: 20 BRPM | OXYGEN SATURATION: 99 % | TEMPERATURE: 97 F
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
## 2018-10-28 DIAGNOSIS — M54.50 ACUTE BILATERAL LOW BACK PAIN WITHOUT SCIATICA: ICD-10-CM

## 2018-10-28 PROCEDURE — 99283 EMERGENCY DEPT VISIT LOW MDM: CPT | Mod: Z6 | Performed by: FAMILY MEDICINE

## 2018-10-28 PROCEDURE — 99282 EMERGENCY DEPT VISIT SF MDM: CPT | Performed by: FAMILY MEDICINE
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
## 2018-10-28 NOTE — LETTER
[facility]  Emergency Room  [address] [place]   [address]  Tel: [phone]   Fax: [phone]  2018    Linette Rosa  [address] [place] [address]  [phone] (home)     : 1995          To Whom it May Concern:    Linette Rosa was seen in our ER today, 2018. I expect her condition to improve over the next 3 days.  She may return to work tonight, but should have work restrictions..  Avoid bending at the waist, squatting, or lifting of greater than 20 pounds for the next 3 days.      Please contact me for questions or concerns.    Sincerely,      Danny Rienoso MD

## 2018-10-28 NOTE — ED AVS SNAPSHOT
[facility] Emergency Department    [address] [place] [address]    Phone:  [phone]    Fax:  [phone]                                       Linette Rosa   MRN: [id number]    Department:  [facility] Emergency Department   Date of Visit:  10/28/2018           After Visit Summary Signature Page     I have received my discharge instructions, and my questions have been answered. I have discussed any challenges I see with this plan with the nurse or doctor.    ..........................................................................................................................................  Patient/Patient Representative Signature      ..........................................................................................................................................  Patient Representative Print Name and Relationship to Patient    ..................................................               ................................................  Date                                   Time    ..........................................................................................................................................  Reviewed by Signature/Title    ...................................................              ..............................................  Date                                               Time          22EPIC Rev 08/18
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
## 2018-10-29 NOTE — DISCHARGE INSTRUCTIONS
Thank you for giving us the opportunity to see you. The impression is that you have a low back strain/strain.    Apply ice for 1-2 days, then switch to heat.    Continue with ibuprofen/Tylenol every 6 hours as needed for pain.  Take your Flexeril up to 3 times a day for pain and spasms.    If you are not seeing an improvement within 3-5 days, please follow up with your primary care provider or clinic.     After discharge, please closely monitor for any new or worsening symptoms. Return to the Emergency Department at any time if your symptoms worsen.        General Neck and Back Pain    Both neck and back pain are usually caused by injury to the muscles or ligaments of the spine. Sometimes the disks that separate each bone of the spine may cause pain by pressing on a nearby nerve. Back and neck pain may appear after a sudden twisting or bending force (such as in a car accident), or sometimes after a simple awkward movement. In either case, muscle spasm is often present and adds to the pain.  Acute neck and back pain usually gets better in 1 to 2 weeks. Pain related to disk disease, arthritis in the spinal joints or spinal stenosis (narrowing of the spinal canal) can become chronic and last for months or years.  Back and neck pain are common problems. Most people feel better in 1 or 2 weeks, and most of the rest in 1 to 2 months. Most people can remain active.  People have and describe pain differently.    Pain can be sharp, stabbing, shooting, aching, cramping, or burning    Movement, standing, bending, lifting, sitting, or walking may worsen the pain    Pain can be localized to one spot or area, or it can be more generalized    Pain can spread or radiate upwards, downwards, to the front, or go down your arms    Muscle spasm may occur.  Most of the time mechanical problems with the muscles or spine cause the pain. it is usually caused by an injury, whether known or not, to the muscles or ligaments. While illnesses  can cause back pain, it is usually not caused by a serious illness. Pain is usually related to physical activity, whether sports, exercise, work, or normal activity. Sometimes it can occur without an identifiable cause. This can happen simply by stretching or moving wrong, without noting pain at the time. Other causes include:    Overexertion, lifting, pushing, pulling incorrectly or too aggressively.    Sudden twisting, bending or stretching from an accident (car or fall), or accidental movement.    Poor posture    Poor conditioning, lack of regular exercise    Spinal disc disease or arthritis    Stress    Pregnancy, or illness like appendicitis, bladder or kidney infection, pelvic infections   Home care    For neck pain: Use a comfortable pillow that supports the head and keeps the spine in a neutral position. The position of the head should not be tilted forward or backward.    When in bed, try to find a position of comfort. A firm mattress is best. Try lying flat on your back with pillows under your knees. You can also try lying on your side with your knees bent up towards your chest and a pillow between your knees.    At first, do not try to stretch out the sore spots. If there is a strain, it is not like the good soreness you get after exercising without an injury. In this case, stretching may make it worse.    Don't sit for long periods, as in long car rides or other travel. This puts more stress on the lower back than standing or walking.    During the first 24 to 72 hours after an injury, apply an ice pack to the painful area for 20 minutes and then remove it for 20 minutes over a period of 60 to 90 minutes or several times a day.     You can alternate ice and heat therapies. Talk with your healthcare provider about the best treatment for your back or neck pain. As a safety precaution, do not use a heating pad at bedtime. Sleeping with a heating pad can lead to skin burns or tissue damage.    Therapeutic  massage can help relax the back and neck muscles without stretching them.    Be aware of safe lifting methods and do not lift anything over 15 pounds until all the pain is gone.  Medicines  Talk to your healthcare provider before using medicine, especially if you have other medical problems or are taking other medicines.    You may use over-the-counter medicine to control pain, unless another pain medicine was prescribed. If you have chronic conditions like diabetes, liver or kidney disease, stomach ulcers,  gastrointestinal bleeding, or are taking blood thinner medicines.    Be careful if you are given pain medicines, narcotics, or medicine for muscle spasm. They can cause drowsiness, and can affect your coordination, reflexes, and judgment. Do not drive or operate heavy machinery.  Follow-up care  Follow up with your healthcare provider, or as advised. Physical therapy or further tests may be needed.  If X-rays were taken, you will be notified of any new findings that may affect your care.  Call 911  Call 911 if any of the following occur:    Trouble breathing    Confusion    Very drowsy or trouble awakening    Fainting or loss of consciousness    Rapid or very slow heart rate    Loss of bowel or bladder control  When to seek medical advice  Call your healthcare provider right away if any of these occur:    Pain becomes worse or spreads into your arms or legs    Weakness, numbness or pain in one or both arms or legs    Numbness in the groin area    Difficulty walking    Fever of 100.4 F (38 C) or higher, or as directed by your healthcare provider  Date Last Reviewed: 7/1/2016 2000-2017 [employer]. [address] [place] [address], [place] [address]. All rights reserved. This information is not intended as a substitute for professional medical care. Always follow your healthcare professional's instructions.

## 2018-10-30 PROBLEM — M54.50 ACUTE BILATERAL LOW BACK PAIN WITHOUT SCIATICA: Status: RESOLVED | Noted: 2018-01-23 | Resolved: 2018-10-30

## 2018-10-30 NOTE — PROGRESS NOTES
"Discharge Note    Progress reporting period is from initial eval to Feb 8, 2018.     Linette failed to return for next follow up visit and current status is unknown.  Please see information below for last relevant information on current status.  Patient seen for 5 visits.  SUBJECTIVE  Subjective changes noted by patient:  Pt reports she has some very bad days, worse following work, pain today rated at 6/10 ,level. Continues to get tingling in the R leg with driving. She continues to await auth for her MRI scan. She continues with her work restrictions, has MRI scheduled for 2-19-18. Continues to be unable to sleep and night, states she is losing \"all of it\" for sleep.   .  Current pain level is 6/10.     Previous pain level was  8/10.   Changes in function:  No change   Adverse reaction to treatment or activity: None    OBJECTIVE  Changes noted in objective findings: Primary pain L side and central low lumbar spine. SI joint with low R iliac crest, low R PSIS, (+) R standing flexon test, (+) L giletts test. Low L ASIS. Sacral flexion test (+) for L on R torsion.      ASSESSMENT/PLAN  Diagnosis: LBP   DIAGP:  The encounter diagnosis was Acute bilateral low back pain without sciatica.  Updated problem list and treatment plan:   Pain - HEP  Decreased ROM/flexibility - HEP  Decreased function - HEP  Inflammation - HEP  STG/LTGs have been met or progress has been made towards goals:  No improvement   Assessment of Progress: current status is unknown.    Last current status: Pt is progressing slower than anticipated   Self Management Plans:  HEP  I have re-evaluated this patient and find that the nature, scope, duration and intensity of the therapy is appropriate for the medical condition of the patient.  Linette continues to require the following intervention to meet STG and LTG's:  HEP.    Recommendations:  Discharge with current home program.  Patient to follow up with MD as needed.    Please refer to the daily flowsheet " for treatment today, total treatment time and time spent performing 1:1 timed codes.

## 2019-10-18 NOTE — PROGRESS NOTES
SUBJECTIVE:   Linette Rosa is a 22 year old female who presents to clinic today for the following health issues:      Concern - sores under armpits  Onset: ongoing    Description:   Sore areas on armpits    Intensity: moderate    Progression of Symptoms:  worsening    Accompanying Signs & Symptoms:  Draining, pus filled    Previous history of similar problem:   Has had this before, but not this back    Precipitating factors:   Worsened by: none    Alleviating factors:  Improved by: none    Therapies Tried and outcome: hot packs not helping    The patient is seen in clinic regarding recurrent boils within the bilateral axilla.  Typically they seem to resolve on their own for short time, but they have been more persistent now and are tender.    Problem list and histories reviewed & adjusted, as indicated.  Additional history: as documented    BP Readings from Last 3 Encounters:   05/30/18 128/70   03/20/18 128/74   03/08/18 (!) 156/95    Wt Readings from Last 3 Encounters:   05/30/18 (!) 331 lb 4.8 oz (150.3 kg)   03/20/18 (!) 329 lb 4 oz (149.3 kg)   03/06/18 (!) 328 lb (148.8 kg)                    Reviewed and updated as needed this visit by clinical staff       Reviewed and updated as needed this visit by Provider         ROS:  Constitutional, HEENT, cardiovascular, pulmonary, gi and gu systems are negative, except as otherwise noted.    OBJECTIVE:     /70  Pulse 100  Temp 98  F (36.7  C) (Temporal)  Wt (!) 331 lb 4.8 oz (150.3 kg)  LMP 05/01/2018  SpO2 98%  BMI 46.21 kg/m2  Body mass index is 46.21 kg/(m^2).   GENERAL: Pleasant obese female in no acute distress  The bilateral axilla are 2/3 areas of palpable induration and mild erythema.  No fluctuance or pointing.  Scarring is noted from previous outbreaks        ASSESSMENT/PLAN:     Problem List Items Addressed This Visit        Medium    Hidradenitis axillaris - Primary    Relevant Medications    cephALEXin (KEFLEX) 500 MG capsule            Keflex 500 mg 3 times daily for 10 days  Up-to-date was accessed for further recommendations for management.  These were discussed with the patient including: Working on weight loss, smoking cessation, avoid wearing tight clothing which will cause increased heat and shearing action against the skin.  Also was given a bottle of 4% chlorhexidine antiseptic soap to use daily for cleansing.  Avoid occlusion with an antiperspirant overnight.  Follow-up in clinic  For persistent problems    VINEET Scanlon [facility]   154.94

## 2021-08-17 ENCOUNTER — APPOINTMENT (OUTPATIENT)
Dept: OCCUPATIONAL MEDICINE | Facility: CLINIC | Age: 26
End: 2021-08-17

## 2021-08-17 PROCEDURE — 99000 SPECIMEN HANDLING OFFICE-LAB: CPT | Performed by: FAMILY MEDICINE

## 2021-08-17 PROCEDURE — 99499 UNLISTED E&M SERVICE: CPT | Performed by: FAMILY MEDICINE

## 2022-09-29 ENCOUNTER — HOSPITAL ENCOUNTER (EMERGENCY)
Facility: CLINIC | Age: 27
Discharge: HOME | End: 2022-09-29
Attending: PHYSICIAN ASSISTANT | Admitting: PHYSICIAN ASSISTANT
Payer: OTHER MISCELLANEOUS

## 2022-09-29 ENCOUNTER — APPOINTMENT (OUTPATIENT)
Dept: GENERAL RADIOLOGY | Facility: CLINIC | Age: 27
End: 2022-09-29
Attending: PHYSICIAN ASSISTANT
Payer: OTHER MISCELLANEOUS

## 2022-09-29 VITALS
TEMPERATURE: 99.2 F | HEART RATE: 92 BPM | WEIGHT: 293 LBS | OXYGEN SATURATION: 97 % | BODY MASS INDEX: 43.93 KG/M2 | RESPIRATION RATE: 16 BRPM

## 2022-09-29 DIAGNOSIS — S69.91XA HAND INJURY, RIGHT, INITIAL ENCOUNTER: ICD-10-CM

## 2022-09-29 PROCEDURE — 73130 X-RAY EXAM OF HAND: CPT | Mod: RT

## 2022-09-29 PROCEDURE — G0463 HOSPITAL OUTPT CLINIC VISIT: HCPCS | Performed by: PHYSICIAN ASSISTANT

## 2022-09-29 PROCEDURE — 99202 OFFICE O/P NEW SF 15 MIN: CPT | Performed by: PHYSICIAN ASSISTANT

## 2022-09-29 ASSESSMENT — ACTIVITIES OF DAILY LIVING (ADL): ADLS_ACUITY_SCORE: 35

## 2022-09-29 ASSESSMENT — ENCOUNTER SYMPTOMS: CONSTITUTIONAL NEGATIVE: 1

## 2022-09-29 NOTE — ED PROVIDER NOTES
History     Chief Complaint   Patient presents with     Hand Injury     300lb steel cylinder fell onto hand     HPI  Linette Rosa is a 26 year old female who presents with complaints of right hand pain and swelling.  Pt states she accidentally had a 300 lb steel cylinder fall onto her hand while at work.  She has had pain, swelling, and bruising to her hand since that time.  Pt has increased pain with making a fist.  Pt is right-hand dominant.        Allergies:  Allergies   Allergen Reactions     [place] Oil [Fish Oil] Swelling       Problem List:    Patient Active Problem List    Diagnosis Date Noted     Hidradenitis axillaris 05/30/2018     Priority: Medium     Lumbar radiculopathy 02/23/2018     Priority: Medium     Lumbar disc herniation with radiculopathy 02/23/2018     Priority: Medium     Morbid obesity due to excess calories (H) 01/02/2017     Priority: Medium     Morbid obesity with BMI of 40.0-44.9, adult (H) 06/20/2014     Priority: Medium     Vitamin D deficiency 06/20/2014     Priority: Medium     PCOS (polycystic ovarian syndrome) 06/01/2011     Priority: Medium        Past Medical History:    Past Medical History:   Diagnosis Date     Obesity      Vitamin D deficiency        Past Surgical History:    Past Surgical History:   Procedure Laterality Date     INJECT EPIDURAL TRANSFORAMINAL Right 3/8/2018    Procedure: INJECT EPIDURAL TRANSFORAMINAL;  right transforaminal sacral 1 epidural steroid injection;  Surgeon: Jerman Rudd MD;  Location: [facility] OR     WRIST SURGERY         Family History:    Family History   Problem Relation Age of Onset     Diabetes Mother      Unknown/Adopted Maternal Grandmother      Unknown/Adopted Maternal Grandfather      Unknown/Adopted Paternal Grandmother      Unknown/Adopted Paternal Grandfather        Social History:  Marital Status:  Single [1]  Social History     Tobacco Use     Smoking status: Current Every Day Smoker     Packs/day: 0.50     Years: 9.00     Pack  years: 4.50     Types: Cigarettes     Smokeless tobacco: Never Used     Tobacco comment: started age 12, officially  when she was 16   Substance Use Topics     Alcohol use: Yes     Alcohol/week: 0.0 standard drinks     Comment: occasionally     Drug use: No        Medications:    Acetaminophen (TYLENOL PO)  cephALEXin (KEFLEX) 500 MG capsule  IBUPROFEN PO          Review of Systems   Constitutional: Negative.    Musculoskeletal:        Right hand pain and swelling   Skin: Negative.    All other systems reviewed and are negative.      Physical Exam   Pulse: 92  Temp: 99.2  F (37.3  C)  Resp: 16  Weight: 142.9 kg (315 lb)  SpO2: 97 %      Physical Exam  Constitutional:       General: She is not in acute distress.     Appearance: Normal appearance. She is not ill-appearing, toxic-appearing or diaphoretic.   HENT:      Head: Normocephalic and atraumatic.   Eyes:      Conjunctiva/sclera: Conjunctivae normal.   Pulmonary:      Effort: Pulmonary effort is normal.   Musculoskeletal:      Right wrist: Normal. No swelling, tenderness or bony tenderness. Normal range of motion.      Right hand: Swelling, tenderness and bony tenderness present. No deformity or lacerations. Decreased range of motion. Normal strength. Normal sensation. There is no disruption of two-point discrimination. Normal capillary refill. Normal pulse.      Cervical back: Neck supple.      Comments: Swelling, tenderness, and bruising to dorsal right hand overlying MCP joints.     Skin:     General: Skin is warm and dry.      Capillary Refill: Capillary refill takes less than 2 seconds.   Neurological:      General: No focal deficit present.      Mental Status: She is alert.      Sensory: Sensation is intact.      Motor: Motor function is intact.         ED Course                 Procedures      Results for orders placed or performed during the hospital encounter of 09/29/22 (from the past 24 hour(s))   XR Hand Right G/E 3 Views    Narrative    EXAM: HAND  RIGHT THREE OR MORE VIEWS   DATE/TIME: 9/29/2022 1:23 PM     INDICATION: Right hand crushing injury and pain.  COMPARISON: None.      Impression    IMPRESSION: Normal joint spacing and alignment.  No fracture.    MALINA TERAN MD         SYSTEM ID:  [id number]       Medications - No data to display    Assessments & Plan (with Medical Decision Making)     Pt is a 26 year old female who presents with complaints of right hand pain and swelling.  Pt states she accidentally had a 300 lb steel cylinder fall onto her hand while at work.  She has had pain, swelling, and bruising to her hand since that time.  Pt has increased pain with making a fist.  Pt is right-hand dominant.       Pt is afebrile on arrival.  Exam as above.  X-rays of right hand were negative for fracture or malalignment.  Discussed results with patient.  Encouraged symptomatic treatments at home.  Return precautions were reviewed.  Hand-outs were provided.    Patient was instructed to follow-up with PCP in 3-5 days for continued care and management or sooner if new or worsening symptoms.  She is to return to the ED for persistent and/or worsening symptoms.  Patient expressed understanding of the diagnosis and plan and was discharged home in good condition.    I have reviewed the nursing notes.    I have reviewed the findings, diagnosis, plan and need for follow up with the patient.    Discharge Medication List as of 9/29/2022  2:00 PM          Final diagnoses:   Hand injury, right, initial encounter       9/29/2022   [facility] EMERGENCY DEPT    Disclaimer:  This note consists of symbols derived from keyboarding, dictation and/or voice recognition software.  As a result, there may be errors in the script that have gone undetected.  Please consider this when interpreting information found in this chart.     Virginia Sweeney PA-C  09/29/22 1724

## 2022-12-28 NOTE — MR AVS SNAPSHOT
After Visit Summary   2/22/2018    Linette Rosa    MRN: [id number]           Patient Information     Date Of Birth          1995        Visit Information        Provider Department      2/22/2018 2:10 PM Isha Stewart PA-C [facility]        Today's Diagnoses     Lumbar radiculopathy    -  1      Care Instructions    Lumbar steroid injection ordered - they will contact you in 1-2 days to schedule    Please contact the clinic if pain persists at [phone].            Follow-ups after your visit        Your next 10 appointments already scheduled     Feb 23, 2018 10:00 AM CST   Office Visit with Lorraine Hopkins PA-C   [facility] ([facility]    [address] [address] [phone]           Bring a current list of meds and any records pertaining to this visit. For Physicals, please bring immunization records and any forms needing to be filled out. Please arrive 10 minutes early to complete paperwork.              Future tests that were ordered for you today     Open Future Orders        Priority Expected Expires Ordered    XR Lumbar Epidural Injection Incl Imaging Routine 2/22/2018 2/22/2019 2/22/2018            Who to contact     If you have questions or need follow up information about today's clinic visit or your schedule please contact [facility] directly at [phone].  Normal or non-critical lab and imaging results will be communicated to you by MyChart, letter or phone within 4 business days after the clinic has received the results. If you do not hear from us within 7 days, please contact the clinic through MyChart or phone. If you have a critical or abnormal lab result, we will notify you by phone as soon as possible.  Submit refill requests through [employer] or call your pharmacy and they will forward the refill request to us. Please allow 3 business days for your refill to be  Target blood sugars less than 150 before meals.    Medicare Wellness Visit  Plan for Preventive Care    A good way for you to stay healthy is to use preventive care.  Medicare covers many services that can help you stay healthy.* The goal of these services is to find any health problems as quickly as possible. Finding problems early can help make them easier to treat.  Your personal plan below lists the services you may need and when they are due.      Health Maintenance Summary       Hepatitis B Vaccine (For Physician/APC Discussion) (1 of 3 - 3-dose series)  Overdue - never done    Diabetes Eye Exam (Yearly)  Overdue since 7/1/2016    COVID-19 Vaccine (5 - Booster for Pfizer series)  Overdue since 6/15/2022    Influenza Vaccine (1)  Overdue since 9/1/2022    Breast Cancer Screening (Every 2 Years)  Overdue since 10/19/2022    Diabetes A1C (Every 3 Months)  Next due on 3/12/2023    Diabetes Foot Exam (Yearly)  Next due on 4/20/2023    DM/CKD Microalbumin (Yearly)  Next due on 10/18/2023    DM/CKD GFR (Yearly)  Ordered on 10/19/2022    Colorectal Cancer Risk - Colonoscopy (Every 3 Years)  Next due on 8/3/2024    DTaP/Tdap/Td Vaccine (2 - Td or Tdap)  Next due on 8/9/2029    Shingles Vaccine   Completed    Osteoporosis Screening   Completed    Pneumococcal Vaccine 65+   Completed    Hepatitis C Screening   Completed    Medicare Advantage- Medicare Wellness Visit   Completed    Meningococcal Vaccine   Aged Out    HPV Vaccine   Aged Out             Preventive Care for Women and Men    Heart Screenings (Cardiovascular):  Blood tests are used to check your cholesterol, lipid and triglyceride levels. High levels can increase your risk for heart disease and stroke. High levels can be treated with medications, diet and exercise. Lowering your levels can help keep your heart and blood vessels healthy.  Your provider will order these tests if they are needed.    An ultrasound is done to see if you have an abdominal aortic  "completed.          Additional Information About Your Visit        [employer] Information     [employer] lets you send messages to your doctor, view your test results, renew your prescriptions, schedule appointments and more. To sign up, go to [email or web address] . Click on \"Log in\" on the left side of the screen, which will take you to the Welcome page. Then click on \"Sign up Now\" on the right side of the page.     You will be asked to enter the access code listed below, as well as some personal information. Please follow the directions to create your username and password.     Your access code is: [email or web address]  Expires: 2018  9:44 AM     Your access code will  in 90 days. If you need help or a new code, please call your [facility] clinic or [phone].        Care EveryWhere ID     This is your Care EveryWhere ID. This could be used by other organizations to access your [facility] medical records  [id number]        Your Vitals Were     Temperature Height BMI (Body Mass Index)             97.9  F (36.6  C) (Oral) 5' 11\" (1.803 m) 46.72 kg/m2          Blood Pressure from Last 3 Encounters:   18 144/78   18 132/70   18 (!) 157/98    Weight from Last 3 Encounters:   18 (!) 335 lb (152 kg)   18 (!) 327 lb (148.3 kg)   18 (!) 332 lb (150.6 kg)               Primary Care Provider Fax #    Physician No Ref-Primary [phone]       No address on file        Equal Access to Services     [facility]: Hadii tonja joyner Somarilyn, waaxda luqadaha, qaybta kaalmachandra cope. So [facility] [phone].    ATENCIÓN: Si habla español, tiene a oscar disposición servicios gratuitos de asistencia lingüística. Llame al [phone].    We comply with applicable federal civil rights laws and [place] laws. We do not discriminate on the basis of race, color, national origin, age, disability, sex, sexual orientation, or gender identity.            Thank " aneurysm (AAA).  This is an enlargement of one of the main blood vessels that delivers blood to the body.   In the [place], 9,000 deaths are caused by AAA.  You may not even know you have this problem and as many as 1 in 3 people will have a serious problem if it is not treated.  Early diagnosis allows for more effective treatment and cure.  If you have a family history of AAA or are a male age 65-75 who has smoked, you are at higher risk of an AAA.  Your provider can order this test, if needed.    Colorectal Screening:  There are many tests that are used to check for cancer of your colon and rectum. You and your provider should discuss what test is best for you and when to have it done.  Options include:  Screening Colonoscopy: exam of the entire colon, seen through a flexible lighted tube.  Flexible Sigmoidoscopy: exam of the last third (sigmoid portion) of the colon and rectum, seen through a flexible lighted tube.  Cologuard DNA stool test: a sample of your stool is used to screen for cancer and unseen blood in your stool.  Fecal Occult Blood Test: a sample of your stool is studied to find any unseen blood    Flu Shot:  An immunization that helps to prevent influenza (the flu). You should get this every year. The best time to get the shot is in the fall.    Pneumococcal Shot:  Vaccines help prevent pneumococcal disease, which is any type of illness caused by Streptococcus pneumoniae bacteria. There are two kinds of pneumococcal vaccines available in the [place]:   Pneumococcal conjugate vaccines (PCV20 or Cnjbcyu55®)  Pneumococcal polysaccharide vaccine (PPSV23 or Mriswpter04®)  For those who have never received any pneumococcal conjugate vaccine, CDC recommends PVC20 for adults 65 years or older and adults 19 through 64 years old with certain medical conditions or risk factors.   For those who have previously received PCV13, this should be followed by a dose of PPSV23.     Hepatitis B Shot:  An  immunization that helps to protect people from getting Hepatitis B. Hepatitis B is a virus that spreads through contact with infected blood or body fluids. Many people with the virus do not have symptoms.  The virus can lead to serious problems, such as liver disease. Some people are at higher risk than others. Your doctor will tell you if you need this shot.     Diabetes Screening:  A test to measure sugar (glucose) in your blood is called a fasting blood sugar. Fasting means you cannot have food or drink for at least 8 hours before the test. This test can detect diabetes long before you may notice symptoms.    Glaucoma Screening:  Glaucoma screening is performed by your eye doctor. The test measures the fluid pressure inside your eyes to determine if you have glaucoma.     Hepatitis C Screening:  A blood test to see if you have the hepatitis C virus.  Hepatitis C attacks the liver and is a major cause of chronic liver disease.  Medicare will cover a single screening for all adults born between 1945 & 1965, or high risk patients (people who have injected illegal drugs or people who have had blood transfusions).  High risk patients who continue to inject illegal drugs can be screened for Hepatitis C every year.    Smoking and Tobacco-Use Cessation Counseling:  Tobacco is the single greatest cause of disease and early death in our country today. Medication and counseling together can increase a person’s chance of quitting for good.   Medicare covers two quitting attempts per year, with four counseling sessions per attempt (eight sessions in a 12 month period)    Preventive Screening tests for Women    Screening Mammograms and Breast Exams:  An x-ray of your breasts to check for breast cancer before you or your doctor may be able to feel it.  If breast cancer is found early it can usually be treated with success.    Pelvic Exams and Pap Tests:  An exam to check for cervical and vaginal cancer. A Pap test is a lab test  you!     Thank you for choosing [facility]  for your care. Our goal is always to provide you with excellent care. Hearing back from our patients is one way we can continue to improve our services. Please take a few minutes to complete the written survey that you may receive in the mail after your visit with us. Thank you!             Your Updated Medication List - Protect others around you: Learn how to safely use, store and throw away your medicines at www.disposemymeds.org.          This list is accurate as of 2/22/18  2:30 PM.  Always use your most recent med list.                   Brand Name Dispense Instructions for use Diagnosis    IBUPROFEN PO      Take 800 mg by mouth        methocarbamol 750 MG tablet    ROBAXIN    90 tablet    Take 1 tablet (750 mg) by mouth 4 times daily as needed for muscle spasms    Acute bilateral low back pain without sciatica, Back strain, subsequent encounter       naproxen 500 MG tablet    NAPROSYN    60 tablet    Take 1 tablet (500 mg) by mouth 2 times daily as needed for moderate pain    Back strain, subsequent encounter       oxyCODONE IR 5 MG tablet    ROXICODONE    60 tablet    Take 1 tablet (5 mg) by mouth every 6 hours as needed for pain    Acute bilateral low back pain without sciatica       TYLENOL PO      Take 1,000 mg by mouth           in which cells are taken from your cervix and sent to the lab to look for signs of cervical cancer. If cancer of the cervix is found early, chances for a cure are good. Testing can generally end at age 65, or if a woman has a hysterectomy for a benign condition. Your provider may recommend more frequent testing if certain abnormal results are found.    Bone Mass Measurements:  A painless x-ray of your bone density to see if you are at risk for a broken bone. Bone density refers to the thickness of bones or how tightly the bone tissue is packed.    Preventive Screening tests for Men    Prostate Screening:  Should you have a prostate cancer test (PSA)?  It is up to you to decide if you want a prostate cancer test. Talk to your clinician to find out if the test is right for you.  Things for you to consider and talk about should include:  Benefits and harms of the test  Your family history  How your race/ethnicity may influence the test  If the test may impact other medical conditions you have  Your values on screenings and treatments    *Medicare pays for many preventive services to keep you healthy. For some of these services, you might have to pay a deductible, coinsurance, and / or copayment.  The amounts vary depending on the type of services you need and the kind of Medicare health plan you have.    For further details on screenings offered by Medicare please visit: https://www.medicare.gov/coverage/preventive-screening-services

## 2023-06-09 NOTE — PATIENT INSTRUCTIONS
1. Schedule MRI   2. Follow up 2-3 days later with neurosurgery team   3. Recheck with provider 2-3 weeks          Suprapubic

## 2023-06-28 ENCOUNTER — HOSPITAL ENCOUNTER (EMERGENCY)
Facility: CLINIC | Age: 28
Discharge: HOME | End: 2023-06-28
Attending: EMERGENCY MEDICINE | Admitting: EMERGENCY MEDICINE
Payer: COMMERCIAL

## 2023-06-28 VITALS
RESPIRATION RATE: 18 BRPM | HEIGHT: 72 IN | HEART RATE: 90 BPM | OXYGEN SATURATION: 97 % | TEMPERATURE: 99.2 F | WEIGHT: 293 LBS | DIASTOLIC BLOOD PRESSURE: 104 MMHG | BODY MASS INDEX: 39.68 KG/M2 | SYSTOLIC BLOOD PRESSURE: 177 MMHG

## 2023-06-28 DIAGNOSIS — R03.0 ELEVATED BP WITHOUT DIAGNOSIS OF HYPERTENSION: ICD-10-CM

## 2023-06-28 DIAGNOSIS — M79.642 PAIN OF LEFT HAND: ICD-10-CM

## 2023-06-28 PROCEDURE — 29125 APPL SHORT ARM SPLINT STATIC: CPT | Mod: LT | Performed by: EMERGENCY MEDICINE

## 2023-06-28 PROCEDURE — 99283 EMERGENCY DEPT VISIT LOW MDM: CPT | Performed by: EMERGENCY MEDICINE

## 2023-06-28 PROCEDURE — 99284 EMERGENCY DEPT VISIT MOD MDM: CPT | Mod: 25 | Performed by: EMERGENCY MEDICINE

## 2023-06-28 NOTE — Clinical Note
Linette Rosa was seen and treated in our emergency department on 6/28/2023.  She may return to work on 07/01/2023.       If you have any questions or concerns, please don't hesitate to call.      Martinez Patel MD

## 2023-06-28 NOTE — DISCHARGE INSTRUCTIONS
Rest, ice.    Ibuprofen for pain and inflammation.   Follow up with primary doctor or sports medicine/orthopedics if ongoing pains.

## 2023-06-28 NOTE — ED PROVIDER NOTES
History     Chief Complaint   Patient presents with     Hand Pain     Wrist Pain     HPI  Linette Rosa is a 27 year old female who presents to the emergency department with concerns regarding left hand pain.  Reports approximately 2-day history of increased amounts of left hand pain especially over the thenar aspect.  Patient performs repetitive motion with squeezing and stamping/pressing at work.  Therefore, with a repetitive motion, patient has had increased amounts of pain especially of the left hand.  No numbness, or tingling.  No specific traumatic injury.    Allergies:  Allergies   Allergen Reactions     Brickeys Oil [Fish Oil] Swelling       Problem List:    Patient Active Problem List    Diagnosis Date Noted     Hidradenitis axillaris 05/30/2018     Priority: Medium     Lumbar radiculopathy 02/23/2018     Priority: Medium     Lumbar disc herniation with radiculopathy 02/23/2018     Priority: Medium     Morbid obesity due to excess calories (H) 01/02/2017     Priority: Medium     Morbid obesity with BMI of 40.0-44.9, adult (H) 06/20/2014     Priority: Medium     Vitamin D deficiency 06/20/2014     Priority: Medium     PCOS (polycystic ovarian syndrome) 06/01/2011     Priority: Medium        Past Medical History:    Past Medical History:   Diagnosis Date     Obesity      Vitamin D deficiency        Past Surgical History:    Past Surgical History:   Procedure Laterality Date     INJECT EPIDURAL TRANSFORAMINAL Right 3/8/2018    Procedure: INJECT EPIDURAL TRANSFORAMINAL;  right transforaminal sacral 1 epidural steroid injection;  Surgeon: Jerman Rudd MD;  Location: [facility] OR     WRIST SURGERY         Family History:    Family History   Problem Relation Age of Onset     Diabetes Mother      Unknown/Adopted Maternal Grandmother      Unknown/Adopted Maternal Grandfather      Unknown/Adopted Paternal Grandmother      Unknown/Adopted Paternal Grandfather        Social History:  Marital Status:  Single [1]  Social  "History     Tobacco Use     Smoking status: Every Day     Packs/day: 0.50     Years: 9.00     Pack years: 4.50     Types: Cigarettes     Smokeless tobacco: Never     Tobacco comments:     started age 12, officially  when she was 16   Substance Use Topics     Alcohol use: Yes     Alcohol/week: 0.0 standard drinks of alcohol     Comment: occasionally     Drug use: No        Medications:    Acetaminophen (TYLENOL PO)  cephALEXin (KEFLEX) 500 MG capsule  IBUPROFEN PO          Review of Systems  See HPI  Physical Exam   BP: (!) 200/115  Pulse: 90  Temp: 99.2  F (37.3  C)  Resp: 18  Height: 181.6 cm (5' 11.5\")  Weight: 136.1 kg (300 lb)  SpO2: 97 %      Physical Exam  Patient tearful.  Normal distal sensation of all digits of left upper extremity.  Able to range the left hand in all directions.  There is tenderness over the thenar aspect of the left hand.  No laceration  ED Course                 Procedures              Critical Care time:  none               No results found for this or any previous visit (from the past 24 hour(s)).    Medications - No data to display    Assessments & Plan (with Medical Decision Making)  27 year old female, right-hand-dominant, presenting with left hand pain.  Patient with pain over the thenar aspect.  No injury or specific trauma noted.  Does have repetitive motions, and therefore likely component of carpal tunnel versus other neurovascular related pains.  Recommended ice, symptomatic treatment with ibuprofen, and wrist brace applied.  Follow-up in clinic recommended.  Patient did arrive significantly hypertensive.  Is anxious, and tearful in the room, which may  contributed to elevated blood pressure.  Regardless, patient encouraged monitoring as an outpatient, and follow-up with clinic regarding elevated blood pressure.  This did decrease some prior to discharge from the emergency department.     I have reviewed the nursing notes.    I have reviewed the findings, diagnosis, plan and " need for follow up with the patient.                 Discharge Medication List as of 6/28/2023  7:57 AM          Final diagnoses:   Pain of left hand   Elevated BP without diagnosis of hypertension       6/28/2023   [facility] EMERGENCY DEPT     Martinez Patel MD  06/28/23 0810

## 2023-06-28 NOTE — ED TRIAGE NOTES
Pt here with left wrist and hand pain. Pt does repetitive motion at work. No known injury/trauma. Pt states that it has been bothering her for a couple days. Pt has not taken anything for her pain today.      Triage Assessment     Row Name 06/28/23 0735       Triage Assessment (Adult)    Airway WDL WDL       Respiratory WDL    Respiratory WDL WDL       Skin Circulation/Temperature WDL    Skin Circulation/Temperature WDL WDL       Cardiac WDL    Cardiac WDL WDL       Peripheral/Neurovascular WDL    Peripheral Neurovascular WDL WDL       Cognitive/Neuro/Behavioral WDL    Cognitive/Neuro/Behavioral WDL WDL

## 2023-06-28 NOTE — ED NOTES
Left wrist velcro splint applied by ERT. Pt aware to follow up with primary provider per AVS. Pt stated understanding of discharge instructions.

## 2023-07-07 ENCOUNTER — ANCILLARY PROCEDURE (OUTPATIENT)
Dept: GENERAL RADIOLOGY | Facility: CLINIC | Age: 28
End: 2023-07-07
Payer: COMMERCIAL

## 2023-07-07 ENCOUNTER — TELEPHONE (OUTPATIENT)
Dept: FAMILY MEDICINE | Facility: OTHER | Age: 28
End: 2023-07-07

## 2023-07-07 ENCOUNTER — OFFICE VISIT (OUTPATIENT)
Dept: FAMILY MEDICINE | Facility: CLINIC | Age: 28
End: 2023-07-07
Payer: COMMERCIAL

## 2023-07-07 VITALS
HEART RATE: 82 BPM | BODY MASS INDEX: 41.02 KG/M2 | WEIGHT: 293 LBS | RESPIRATION RATE: 24 BRPM | HEIGHT: 71 IN | OXYGEN SATURATION: 98 % | TEMPERATURE: 97.4 F | SYSTOLIC BLOOD PRESSURE: 139 MMHG | DIASTOLIC BLOOD PRESSURE: 83 MMHG

## 2023-07-07 DIAGNOSIS — Z79.1 LONG TERM (CURRENT) USE OF NON-STEROIDAL ANTI-INFLAMMATORIES (NSAID): ICD-10-CM

## 2023-07-07 DIAGNOSIS — M25.532 LEFT WRIST PAIN: ICD-10-CM

## 2023-07-07 DIAGNOSIS — M79.642 PAIN OF LEFT HAND: Primary | ICD-10-CM

## 2023-07-07 DIAGNOSIS — M79.642 PAIN OF LEFT HAND: ICD-10-CM

## 2023-07-07 PROCEDURE — 73130 X-RAY EXAM OF HAND: CPT | Mod: TC | Performed by: RADIOLOGY

## 2023-07-07 PROCEDURE — 73110 X-RAY EXAM OF WRIST: CPT | Mod: TC | Performed by: RADIOLOGY

## 2023-07-07 PROCEDURE — 99204 OFFICE O/P NEW MOD 45 MIN: CPT

## 2023-07-07 RX ORDER — PREDNISONE 20 MG/1
20 TABLET ORAL DAILY
Qty: 10 TABLET | Refills: 0 | Status: SHIPPED | OUTPATIENT
Start: 2023-07-07 | End: 2023-07-17

## 2023-07-07 RX ORDER — FAMOTIDINE 20 MG/1
20 TABLET, FILM COATED ORAL 2 TIMES DAILY
Qty: 28 TABLET | Refills: 0 | Status: SHIPPED | OUTPATIENT
Start: 2023-07-07 | End: 2023-07-21

## 2023-07-07 RX ORDER — NAPROXEN 500 MG/1
500 TABLET ORAL 2 TIMES DAILY WITH MEALS
Qty: 28 TABLET | Refills: 0 | Status: SHIPPED | OUTPATIENT
Start: 2023-07-07 | End: 2023-07-21

## 2023-07-07 ASSESSMENT — PAIN SCALES - GENERAL: PAINLEVEL: EXTREME PAIN (8)

## 2023-07-07 NOTE — TELEPHONE ENCOUNTER
----- Message from VINEET Montes CNP sent at 7/7/2023 12:39 PM CDT -----  Team - please call patient with results. Both her wrist and hand x-rays were normal - no fractures, dislocation, or abnormal alignment. Continue plan we discussed at visit.

## 2023-07-07 NOTE — TELEPHONE ENCOUNTER
RN called patient/family and relayed provider's message. Patient/family verbalized understanding.     Ly Enrique RN, BSN  [facility]: [place]

## 2023-07-07 NOTE — PROGRESS NOTES
"  Assessment & Plan     Pain of left hand  Left wrist pain  Acute, unclear etiology. DDx includes nerve entrapment/carpal tunnel, arthritis vs inflammatory arthritis. Pt reports pain is persistent or getting worse since ER so getting x-rays and short course of steroids. Advised hand therapy and will consider referral to sports med for injection if no improvement.      - XR Wrist Left G/E 3 Views  - XR Hand Left G/E 3 Views  - predniSONE (DELTASONE) 20 MG tablet  Dispense: 10 tablet; Refill: 0  - naproxen (NAPROSYN) 500 MG tablet  Dispense: 28 tablet; Refill: 0  - Occupational Therapy Referral      Long term (current) use of non-steroidal anti-inflammatories (nsaid)  Recommended naproxen scheduled, advised famotidine for reflux.  - famotidine (PEPCID) 20 MG tablet  Dispense: 28 tablet; Refill: 0      Ordering of each unique test      MED REC REQUIRED  Post Medication Reconciliation Status:       Nicotine/Tobacco Cessation:  She reports that she has been smoking cigarettes. She has a 4.50 pack-year smoking history. She has never been exposed to tobacco smoke. She has never used smokeless tobacco.  Nicotine/Tobacco Cessation Plan:   not discussed today      BMI:   Estimated body mass index is 43.6 kg/m  as calculated from the following:    Height as of this encounter: 1.803 m (5' 11\").    Weight as of this encounter: 141.8 kg (312 lb 9.6 oz).     PATIENT INSTRUCTIONS  Prednisone (steroid) for 10 days to reduce inflammation  Naproxen twice daily with meals for pain  Tylenol (acetaminophen) 1000 mg 2-3 times per day  Over the counter famotidine (pepcid) twice/day  Do not drink alcohol until finished with 2 weeks of naproxen  If you have insomnia - you can take melatonin or try benadryl  Schedule annual exam in 4-6 weeks and we can recheck wrist pain.    VINEET Cook [facility]    Keeley Sue is a 27 year old, presenting for the following health issues:  ER F/U        " "7/7/2023     7:41 AM   Additional Questions   Roomed by Aisha   Accompanied by none         7/7/2023     7:41 AM   Patient Reported Additional Medications   Patient reports taking the following new medications none     HPI     ED/UC Followup:    Facility:  [facility]  Date of visit: 06/28/23  Reason for visit: LEFT hand and wrist pain  Current Status: still very painful, has trouble doing normal things and squeezing things , worse in the morning, then worse again the evening       1st 2 fingers, thumb, and wrist.   Sudden onset, when waking on Wednesday.  Tuesday had aching, sore/tender.  Painful at rest.    Refurbishes gas cylinders for work.    Review of Systems   Constitutional, HEENT, cardiovascular, pulmonary, gi and gu systems are negative, except as otherwise noted.      Objective    /83 (BP Location: Right arm, Patient Position: Sitting, Cuff Size: Adult Large)   Pulse 82   Temp 97.4  F (36.3  C) (Tympanic)   Resp 24   Ht 1.803 m (5' 11\")   Wt 141.8 kg (312 lb 9.6 oz)   LMP 07/06/2023 (Exact Date)   SpO2 98%   Breastfeeding No   BMI 43.60 kg/m    Body mass index is 43.6 kg/m .  Physical Exam   GENERAL: healthy, alert and no distress  RESP: respiratory effort unlabored and even, no rhonchi or wheezes  CV: regular rate and rhythm, no peripheral edema and peripheral pulses strong  MS: ROM limited in left wrist and fingers due to pain. no gross musculoskeletal defects noted, no edema  SKIN: intact. No ecchymosis, edema, or erythema.  PSYCH: tearful throughout visit.    No results found for this or any previous visit (from the past 24 hour(s)).                "

## 2023-07-07 NOTE — LETTER
July 7, 2023      Linette Rosa  [address] [address]        To Whom It May Concern:    Linette Rosa  was seen on 7/7/23.  Please excuse her  until 7/10/23 due to injury.        Sincerely,        VINEET Cook CNP

## 2023-07-07 NOTE — PATIENT INSTRUCTIONS
Prednisone (steroid) for 10 days to reduce inflammation  Naproxen twice daily with meals for pain  Tylenol (acetaminophen) 1000 mg 2-3 times per day  Over the counter famotidine (pepcid) twice/day  Do not drink alcohol until finished with 2 weeks of naproxen    If you have insomnia - you can take melatonin or try benadryl    Schedule annual exam in 4-6 weeks and we can recheck wrist pain.

## 2023-07-21 ENCOUNTER — OFFICE VISIT (OUTPATIENT)
Dept: FAMILY MEDICINE | Facility: CLINIC | Age: 28
End: 2023-07-21
Payer: COMMERCIAL

## 2023-07-21 VITALS
OXYGEN SATURATION: 98 % | BODY MASS INDEX: 41.02 KG/M2 | WEIGHT: 293 LBS | HEIGHT: 71 IN | RESPIRATION RATE: 26 BRPM | DIASTOLIC BLOOD PRESSURE: 76 MMHG | SYSTOLIC BLOOD PRESSURE: 134 MMHG | HEART RATE: 86 BPM | TEMPERATURE: 97 F

## 2023-07-21 DIAGNOSIS — M79.645 PAIN OF LEFT THUMB: ICD-10-CM

## 2023-07-21 DIAGNOSIS — M79.642 PAIN OF LEFT HAND: ICD-10-CM
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
## 2023-07-21 DIAGNOSIS — M25.532 LEFT WRIST PAIN: ICD-10-CM

## 2023-07-21 PROCEDURE — 99213 OFFICE O/P EST LOW 20 MIN: CPT | Performed by: NURSE PRACTITIONER

## 2023-07-21 RX ORDER — TRAMADOL HYDROCHLORIDE 50 MG/1
50 TABLET ORAL EVERY 6 HOURS PRN
Qty: 10 TABLET | Refills: 0 | Status: SHIPPED | OUTPATIENT
Start: 2023-07-21 | End: 2023-07-24

## 2023-07-21 ASSESSMENT — PAIN SCALES - GENERAL: PAINLEVEL: EXTREME PAIN (8)

## 2023-07-21 NOTE — LETTER
July 21, 2023      Linette Rosa  [address] [address]        To Whom It May Concern:    Linette Rosa was seen on 07/21/2023. Please excuse her until 07/24/2023 due to injury.        Sincerely,        Alison Lawton DNP

## 2023-07-21 NOTE — PROGRESS NOTES
"  Assessment & Plan     Overuse injury  Likely overuse injury. Possible carpal tunnel.     Pain of left hand  No improvement with previous therapies. Symptoms actually worsening. Ortho referral placed. Tramadol prescribed for 10 tablets (3 days). DME thumb brace ordered and given to patient in clinic to help stabilize thumb.     - Orthopedic  Referral; Future  - traMADol (ULTRAM) 50 MG tablet; Take 1 tablet (50 mg) by mouth every 6 hours as needed for severe pain  - Miscellaneous Order for DME - ONLY FOR DME    Left wrist pain  See above.     - Orthopedic  Referral; Future  - traMADol (ULTRAM) 50 MG tablet; Take 1 tablet (50 mg) by mouth every 6 hours as needed for severe pain  - Miscellaneous Order for DME - ONLY FOR DME    Pain of left thumb  See above.     - Orthopedic  Referral; Future  - traMADol (ULTRAM) 50 MG tablet; Take 1 tablet (50 mg) by mouth every 6 hours as needed for severe pain  - Miscellaneous Order for DME - ONLY FOR DME    Prescription drug management         BMI:   Estimated body mass index is 43.46 kg/m  as calculated from the following:    Height as of this encounter: 1.803 m (5' 11\").    Weight as of this encounter: 141.3 kg (311 lb 9.6 oz).       Patient Instructions   Ortho referral placed.     Medication sent to pharmacy.       Alison Lawton DNP  [facility]    Keeley Sue is a 27 year old, presenting for the following health issues:  Musculoskeletal Problem        7/21/2023     8:47 AM   Additional Questions   Roomed by Aisha   Accompanied by none         7/21/2023     8:47 AM   Patient Reported Additional Medications   Patient reports taking the following new medications none     History of Present Illness       Reason for visit:  Hand/wrist pain  Symptom onset:  3-4 weeks ago    She eats 0-1 servings of fruits and vegetables daily.She consumes 2 sweetened beverage(s) daily.She exercises with enough effort to increase her " "heart rate 30 to 60 minutes per day.  She exercises with enough effort to increase her heart rate 5 days per week.   She is taking medications regularly.     No improvement on her Left wrist after finishing prednisone, naproxen, patient has been doing lighter duty at work, has been wearing the wrist brace, has been icing and elevating and getting no results         Additional provider notes: Patient presents in clinic for follow-up on hand/wrist pain. She was originally seen in ER on 6/28/23 and it was thought to be overuse injury likely carpal tunnel vs other neurovascular related pains. She was then seen in clinic on 7/7/23 for persistent pain. X-ray of hand and wrist were negative. OT was ordered and she was prescribed prednisone for 10 days. She has also been using naproxen, doing light duty at work, wearing a brace and icing/elevating with no improvement.     States now her thumb is hurting worse and now it has moved to the base of her 4th and 5th fingers.     Pain level is an 8/10. Pain is worse in the morning. She is tearful in room due to pain.     Allergies   Allergen Reactions     Nemours Oil [Fish Oil] Swelling       Current Outpatient Medications   Medication     Acetaminophen (TYLENOL PO)     IBUPROFEN PO     cephALEXin (KEFLEX) 500 MG capsule     famotidine (PEPCID) 20 MG tablet     naproxen (NAPROSYN) 500 MG tablet     No current facility-administered medications for this visit.       Past Medical History:   Diagnosis Date     Obesity      Vitamin D deficiency             Review of Systems   Musculoskeletal:        Left wrist and thumb pain   All other systems reviewed and are negative.           Objective    /76 (BP Location: Right arm, Patient Position: Sitting, Cuff Size: Adult Large)   Pulse 86   Temp 97  F (36.1  C) (Tympanic)   Resp 26   Ht 1.803 m (5' 11\")   Wt 141.3 kg (311 lb 9.6 oz)   LMP 07/06/2023 (Exact Date)   SpO2 98%   BMI 43.46 kg/m    Body mass index is 43.46 " kg/m .  Physical Exam  Vitals reviewed.   Constitutional:       General: She is not in acute distress.     Appearance: Normal appearance. She is not ill-appearing or toxic-appearing.   Musculoskeletal:        Arms:    Skin:     General: Skin is warm and dry.   Neurological:      Mental Status: She is alert and oriented to person, place, and time.   Psychiatric:         Behavior: Behavior normal.

## 2023-07-25 NOTE — PROGRESS NOTES
ASSESSMENT & PLAN    Linette was seen today for pain.    Diagnoses and all orders for this visit:    Pain of left hand  -     Orthopedic  Referral  -     Hand Therapy Referral; Future  -     Wrist/Arm Supplies Order Thumb Keeper Brace; Left    Left wrist pain  -     Orthopedic  Referral  -     Hand Therapy Referral; Future  -     Wrist/Arm Supplies Order Thumb Keeper Brace; Left        See Patient Instructions  Patient Instructions   Reviewed the left hand and wrist pain.  Rather diffuse symptoms.  We discussed considerations around primarily overuse, may have some de Quervain tendinitis component, less likely carpal tunnel syndrome distribution of symptoms.  Reviewed symptom management, activity modification, imaging, rehab, support, medication options.  Following discussion, okay to use ice, heat, over-the-counter medication as needed for soreness.  Discussed consideration of imaging; no additional imaging with MRI required currently, but can reconsider depending on course.  Discussed trial of hand therapy, referral placed.  Support options reviewed.  Okay to continue with current gamekeeper brace and wrist/hand wrap.  We also reviewed use of thumb spica wrist brace.  Additionally, plan discussion of custom splinting with hand therapy as well.  Regarding medications, consideration for course of anti-inflammatory medications.  For now, okay to continue with over-the-counter ibuprofen.  Plan to monitor course with therapy 1 month to start.  Contact clinic for any other questions or concerns in the meantime.    If you have any further questions for your physician or physician s care team you can contact them thru MyChart or by calling [phone].        Dima Michele DO  [facility]    -----  Chief Complaint   Patient presents with    Left Hand - Pain       SUBJECTIVE  Linette Rosa is a/an 27 year old female who is seen in consultation at the request  "of  Alison Lawton D.N.P. after an ED visit on 6/28/23 for evaluation of left hand injury.     The patient is seen by themselves.  The patient is Ambidextrous handed (primarily right hand)    Onset: 1.5-2 month(s) ago. Reports insidious onset without acute precipitating event. Patient reports possibly from overuse at work  Location of Pain: left thumb pain into the fingers  Worsened by: movement, usage  Better with: none  Treatments tried: rest/activity avoidance, ice, heat, ibuprofen, and OTC's, casting/splinting/bracing  Associated symptoms: clicking of the thumb    Orthopedic/Surgical history: NO  Social History/Occupation: Refurbishing gas cylinders    **  Above information per rooming staff.  Additional history:  In process of doing job, noted pain in left thumb. For job, at the time was using a hammer to strike object held with left hand. Did not strike left hand. Noted pain in the thumb.  At ED, had left arm pain, but focused more on hand.  Since then, has noted pain more focal in left thumb, proximal inde and long fingers, and palmar ulnar hand.  Occasional swelling. No bruising.  Today had some thumb clicking, few times. With pain associated; noted today.  Occasional finger tip tingling. No numbness.      REVIEW OF SYSTEMS:  Review of Systems    OBJECTIVE:  BP (!) 148/88   Ht 1.803 m (5' 11\")   Wt 142 kg (313 lb)   LMP 07/06/2023 (Exact Date)   BMI 43.65 kg/m     General: alert and in no distress  Skin: no suspicious lesions or rash.  CV: distal perfusion intact   Resp: normal respiratory effort without conversational dyspnea   Psych: normal mood and affect  Gait: NORMAL  Neuro: Normal light sensory exam of extremity       Hand/wrist (left):    Inspection:  No deformity noted.  No swelling. No ecchymosis.    Motion:  Forearm pronation full, forearm supination full. Some pain end of supination  Wrist flexion with pain end range (more at wrist)  Wrist extension with pain end range (at wrist)  Wrist " radial deviation wrist pain  Wrist ulnar deviation wrist pain  Digit motion grossly intact; pain thenar eminence with thumb motion; no triggering noted    Radial pulses normal, +2/4, capillary refill brisk.    Palpation:  Tender diffuse--dorsal wrist, dorsal carpals, dorsal hand, radial wrist, thenar eminence    Tinel with local tenderness. Phalen with wrist pain.    Finkelstein some pain radial wrist.      RADIOLOGY:  Final results and radiologist's interpretation, available in the [facility] health record.  Images were reviewed with the patient in the office today.  My personal interpretation of the performed imaging: No acute bony abnormality noted.  No significant degenerative change.        XR WRIST LEFT G/E 3 VIEWS 7/7/2023 8:55 AM      HISTORY: Left wrist pain; Pain of left hand     COMPARISON: None.                                                                    IMPRESSION: Negative left wrist. No evidence for fracture. Normal  carpal alignment.     BEN MENDOZA MD  _____________  XR HAND LEFT G/E 3 VIEWS 7/7/2023 8:49 AM      HISTORY: Left wrist pain; Pain of left hand     COMPARISON: None.                                                                   IMPRESSION: The left hand is negative for fracture or dislocation.     BEN MENDOZA MD      Review of prior external note(s) from - previous eval  Review of the result(s) of each unique test - imaging  Independent interpretation of a test performed by another physician/other qualified health care professional (not separately reported) - imaging

## 2023-08-04 ENCOUNTER — OFFICE VISIT (OUTPATIENT)
Dept: ORTHOPEDICS | Facility: CLINIC | Age: 28
End: 2023-08-04
Payer: COMMERCIAL

## 2023-08-04 VITALS
BODY MASS INDEX: 41.02 KG/M2 | SYSTOLIC BLOOD PRESSURE: 148 MMHG | WEIGHT: 293 LBS | HEIGHT: 71 IN | DIASTOLIC BLOOD PRESSURE: 88 MMHG

## 2023-08-04 DIAGNOSIS — M25.532 LEFT WRIST PAIN: ICD-10-CM

## 2023-08-04 DIAGNOSIS — M79.642 PAIN OF LEFT HAND: ICD-10-CM

## 2023-08-04 PROCEDURE — 99204 OFFICE O/P NEW MOD 45 MIN: CPT | Performed by: PEDIATRICS

## 2023-08-04 NOTE — PATIENT INSTRUCTIONS
Reviewed the left hand and wrist pain.  Rather diffuse symptoms.  We discussed considerations around primarily overuse, may have some de Quervain tendinitis component, less likely carpal tunnel syndrome distribution of symptoms.  Reviewed symptom management, activity modification, imaging, rehab, support, medication options.  Following discussion, okay to use ice, heat, over-the-counter medication as needed for soreness.  Discussed consideration of imaging; no additional imaging with MRI required currently, but can reconsider depending on course.  Discussed trial of hand therapy, referral placed.  Support options reviewed.  Okay to continue with current gamekeeper brace and wrist/hand wrap.  We also reviewed use of thumb spica wrist brace.  Additionally, plan discussion of custom splinting with hand therapy as well.  Regarding medications, consideration for course of anti-inflammatory medications.  For now, okay to continue with over-the-counter ibuprofen.  Plan to monitor course with therapy 1 month to start.  Contact clinic for any other questions or concerns in the meantime.    If you have any further questions for your physician or physician s care team you can contact them thru [email or web address] or by calling [phone].

## 2023-08-04 NOTE — LETTER
8/4/2023         RE: Linette Rosa  [address] [address]        Dear Colleague,    Thank you for referring your patient, Linette Rosa, to the [facility]. Please see a copy of my visit note below.    ASSESSMENT & PLAN    Linette was seen today for pain.    Diagnoses and all orders for this visit:    Pain of left hand  -     Orthopedic  Referral  -     Hand Therapy Referral; Future  -     Wrist/Arm Supplies Order Thumb Keeper Brace; Left    Left wrist pain  -     Orthopedic  Referral  -     Hand Therapy Referral; Future  -     Wrist/Arm Supplies Order Thumb Keeper Brace; Left        See Patient Instructions  Patient Instructions   Reviewed the left hand and wrist pain.  Rather diffuse symptoms.  We discussed considerations around primarily overuse, may have some de Quervain tendinitis component, less likely carpal tunnel syndrome distribution of symptoms.  Reviewed symptom management, activity modification, imaging, rehab, support, medication options.  Following discussion, okay to use ice, heat, over-the-counter medication as needed for soreness.  Discussed consideration of imaging; no additional imaging with MRI required currently, but can reconsider depending on course.  Discussed trial of hand therapy, referral placed.  Support options reviewed.  Okay to continue with current gamekeeper brace and wrist/hand wrap.  We also reviewed use of thumb spica wrist brace.  Additionally, plan discussion of custom splinting with hand therapy as well.  Regarding medications, consideration for course of anti-inflammatory medications.  For now, okay to continue with over-the-counter ibuprofen.  Plan to monitor course with therapy 1 month to start.  Contact clinic for any other questions or concerns in the meantime.    If you have any further questions for your physician or physician s care team you can contact them thru MyChart or by calling  "[phone].        Dima [facility]    -----  Chief Complaint   Patient presents with     Left Hand - Pain       SUBJECTIVE  Linette Rosa is a/an 27 year old female who is seen in consultation at the request of  Alison Lawton D.N.P. after an ED visit on 6/28/23 for evaluation of left hand injury.     The patient is seen by themselves.  The patient is Ambidextrous handed (primarily right hand)    Onset: 1.5-2 month(s) ago. Reports insidious onset without acute precipitating event. Patient reports possibly from overuse at work  Location of Pain: left thumb pain into the fingers  Worsened by: movement, usage  Better with: none  Treatments tried: rest/activity avoidance, ice, heat, ibuprofen, and OTC's, casting/splinting/bracing  Associated symptoms: clicking of the thumb    Orthopedic/Surgical history: NO  Social History/Occupation: Refurbishing gas cylinders    **  Above information per rooming staff.  Additional history:  In process of doing job, noted pain in left thumb. For job, at the time was using a hammer to strike object held with left hand. Did not strike left hand. Noted pain in the thumb.  At ED, had left arm pain, but focused more on hand.  Since then, has noted pain more focal in left thumb, proximal inde and long fingers, and palmar ulnar hand.  Occasional swelling. No bruising.  Today had some thumb clicking, few times. With pain associated; noted today.  Occasional finger tip tingling. No numbness.      REVIEW OF SYSTEMS:  Review of Systems    OBJECTIVE:  BP (!) 148/88   Ht 1.803 m (5' 11\")   Wt 142 kg (313 lb)   LMP 07/06/2023 (Exact Date)   BMI 43.65 kg/m     General: alert and in no distress  Skin: no suspicious lesions or rash.  CV: distal perfusion intact   Resp: normal respiratory effort without conversational dyspnea   Psych: normal mood and affect  Gait: NORMAL  Neuro: Normal light sensory exam of extremity       Hand/wrist " (left):    Inspection:  No deformity noted.  No swelling. No ecchymosis.    Motion:  Forearm pronation full, forearm supination full. Some pain end of supination  Wrist flexion with pain end range (more at wrist)  Wrist extension with pain end range (at wrist)  Wrist radial deviation wrist pain  Wrist ulnar deviation wrist pain  Digit motion grossly intact; pain thenar eminence with thumb motion; no triggering noted    Radial pulses normal, +2/4, capillary refill brisk.    Palpation:  Tender diffuse--dorsal wrist, dorsal carpals, dorsal hand, radial wrist, thenar eminence    Tinel with local tenderness. Phalen with wrist pain.    Finkelstein some pain radial wrist.      RADIOLOGY:  Final results and radiologist's interpretation, available in the [facility] health record.  Images were reviewed with the patient in the office today.  My personal interpretation of the performed imaging: No acute bony abnormality noted.  No significant degenerative change.        XR WRIST LEFT G/E 3 VIEWS 7/7/2023 8:55 AM      HISTORY: Left wrist pain; Pain of left hand     COMPARISON: None.                                                                    IMPRESSION: Negative left wrist. No evidence for fracture. Normal  carpal alignment.     BEN MENDOZA MD  _____________  XR HAND LEFT G/E 3 VIEWS 7/7/2023 8:49 AM      HISTORY: Left wrist pain; Pain of left hand     COMPARISON: None.                                                                   IMPRESSION: The left hand is negative for fracture or dislocation.     BEN MENDOZA MD      Review of prior external note(s) from - previous eval  Review of the result(s) of each unique test - imaging  Independent interpretation of a test performed by another physician/other qualified health care professional (not separately reported) - imaging           Again, thank you for allowing me to participate in the care of your patient.        Sincerely,        Dima Michele DO

## 2023-08-07 ENCOUNTER — THERAPY VISIT (OUTPATIENT)
Dept: OCCUPATIONAL THERAPY | Facility: CLINIC | Age: 28
End: 2023-08-07
Payer: COMMERCIAL

## 2023-08-07 DIAGNOSIS — M25.532 LEFT WRIST PAIN: ICD-10-CM

## 2023-08-07 DIAGNOSIS — M79.642 PAIN OF LEFT HAND: Primary | ICD-10-CM

## 2023-08-07 PROCEDURE — 97110 THERAPEUTIC EXERCISES: CPT | Mod: GO | Performed by: OCCUPATIONAL THERAPIST

## 2023-08-07 PROCEDURE — 97760 ORTHOTIC MGMT&TRAING 1ST ENC: CPT | Mod: GO | Performed by: OCCUPATIONAL THERAPIST

## 2023-08-07 PROCEDURE — 97166 OT EVAL MOD COMPLEX 45 MIN: CPT | Mod: GO | Performed by: OCCUPATIONAL THERAPIST

## 2023-08-07 PROCEDURE — 97535 SELF CARE MNGMENT TRAINING: CPT | Mod: GO | Performed by: OCCUPATIONAL THERAPIST

## 2023-08-07 NOTE — PROGRESS NOTES
OCCUPATIONAL THERAPY EVALUATION  Type of Visit: Evaluation    See electronic medical record for Abuse and Falls Screening details.    Subjective      Presenting condition or subjective complaint: Left hand and wrist pain  Date of onset: 08/04/23 (therapy referral)    Relevant medical history: Overweight; Smoking   Dates & types of surgery: none    Prior diagnostic imaging/testing results: X-ray     Prior therapy history for the same diagnosis, illness or injury: No      Prior Level of Function  ADL: Independent    Employment: Yes Refurbish gas cylinders    Patient goals for therapy: use hand without pain     Objective   ADDITIONAL HISTORY:  Right hand dominant  Patient reports symptoms of pain, weakness/loss of strength, edema, and tingling   Transportation: drives  Currently working in normal job without restrictions    Functional Outcome Measure:   Functional Outcome Measure:  Upper Extremity Functional Index  SCORE:   Column Totals: 35/80  (A lower score indicates greater disability.)    Pain Level (Scale 0-10):   8/7/2023   At Rest 6-7   With Use 8     Pain Description:  Date 8/7/2023   Location wrist, hand, and thumb   Pain Quality Aching and Tender   Frequency intermittent     Pain is worst  daytime and nighttime   Exacerbated by  Dressing, pulling on pants, grasping, twisting   Relieved by cold, heat, otc medications, rest, and splints   Progression Gradual worsening      Sensation   Decreased intermittently in the mornings in Median Nerve distribution per pt report 2 of 7 days    Edema  None on exam, pt reports occasional swelling in wrist and fingers      ROM  Thumb AROM near WNL's but pain end range ext > flexion    Wrist 8/7/2023 8/7/2023   AROM (PROM) Right Left   Extension 75 75+   Flexion 70 70+   RD 25 20+   UD 40 40+   UD with Th Flex 35 25+     Resisted Testing  Pain Report:  - none    + mild    ++ moderate    +++ severe    8/7/2023   Supination  + slight   Pronation + slight   Wrist Ext with RD,  Elbow Ext +   Wrist Ext with UD, Elbow Ext +   Wrist Flex with RD, Elbow Ext +   Wrist Flex with UD, Elbow Ext +   EDC with Elbow Ext + slight   Long Finger Test +   FDS +   APL +   EPB +   EPL +     Strength   (Measured in pounds)  Pain Report: - none  + mild    ++ moderate    +++ severe    8/7/2023 8/7/2023   Trials Right Left   1  2  3 42  52  50 7++  6++  4++   Average 48 6     Lat Pinch 8/7/2023 8/7/2023   Trials Right Left   1  2  3 17  15  15 5++  4++  4++   Average 16 4     3 Pt Pinch 8/7/2023 8/7/2023   Trials Right Left   1  2  3 15  16  12 4++  5++  6++   Average 14 5     Special Tests   Pain Report:  - none    + mild    ++ moderate    +++ severe    8/7/2023   WHAT Test ++   Finkelsteins ++   Thumb CMC Adduction Stress Test +    Thumb CMC Extension Stress Test +     Special Nerve Tests   - none    + mild    ++ moderate    +++ severe       8/7/2023   Elbow Flexion Test -   Tinels Cubital Tunnel -   Tinels [place] -   Median Nerve Compression at Pronator -   Carpal-Compression Test -   Tinels at Carpal Tunnel -   Phalens -   Tinels DRSN -     Palpation  Diffuse tenderness wrist, thumb, palm and fingers, pt tearful with palpation and provocative testing    Assessment & Plan   CLINICAL IMPRESSIONS  Medical Diagnosis: Left hand and wrist pain    Treatment Diagnosis: Left hand and wrist pain    Impression/Assessment: Pt is a 27 year old female presenting to Occupational Therapy due to left wrist and hand pain.  Patient's limitations or Problem List includes: Pain, Decreased ROM/motion, Increased edema, Weakness, Sensory disturbance, Decreased , Decreased pinch, and Tightness in musculature of the left wrist, hand, and thumb which interferes with the patient's ability to perform Self Care Tasks (dressing, eating, bathing), Work Tasks, Sleep Patterns, Recreational Activities, Household Chores, and Driving  as compared to previous level of function.    Clinical Decision Making  (Complexity):  Assessment of Occupational Performance: 5 or more Performance Deficits  Occupational Performance Limitations: bathing/showering, dressing, hygiene and grooming, driving and community mobility, home establishment and management, meal preparation and cleanup, sleep, work, and leisure activities  Clinical Decision Making (Complexity): Moderate complexity    PLAN OF CARE  Treatment Interventions:  Modalities:  US and Paraffin  Therapeutic Exercise:  AROM, PROM, Tendon Gliding, Isotonics, Isometrics, and Stabilization  Neuromuscular re-education:  Nerve Gliding, Posture, and Kinesiotaping  Manual Techniques:  Friction massage and Myofascial release  Orthotic Fabrication:  Static and Forearm based  Self Care:  Self Care Tasks and Ergonomic Considerations    Long Term Goals   OT Goal 1  Goal Description: Pt will decrease pain to be able to pull on pants with mild to no difficulty, pain 3/10 or less  Rationale: In order to maximize safety and independence with performance of self-care activities  Goal Progress: pain 8/10; quite a bit of difficulty per UEFI 1/4  Target Date: 10/05/23      Frequency of Treatment: 1 x per week  Duration of Treatment: 8 weeks     Recommended Referrals to Other Professionals:  none  Education Assessment: Learner/Method: Patient;Demonstration;Pictures/Video  Education Comments: PTRx on phone     Risks and benefits of evaluation/treatment have been explained.   Patient/Family/caregiver agrees with Plan of Care.     Evaluation Time:    OT Eval, Moderate Complexity Minutes (32450): 20   Present: Not applicable     Signing Clinician: Katie Rubalcava OT

## 2023-08-17 ENCOUNTER — THERAPY VISIT (OUTPATIENT)
Dept: OCCUPATIONAL THERAPY | Facility: CLINIC | Age: 28
End: 2023-08-17
Payer: COMMERCIAL

## 2023-08-17 DIAGNOSIS — M79.642 PAIN OF LEFT HAND: Primary | ICD-10-CM

## 2023-08-17 DIAGNOSIS — M25.532 LEFT WRIST PAIN: ICD-10-CM

## 2023-08-17 PROCEDURE — 97763 ORTHC/PROSTC MGMT SBSQ ENC: CPT | Mod: GO | Performed by: OCCUPATIONAL THERAPIST

## 2023-08-17 PROCEDURE — 97110 THERAPEUTIC EXERCISES: CPT | Mod: GO | Performed by: OCCUPATIONAL THERAPIST

## 2023-10-07 ENCOUNTER — HEALTH MAINTENANCE LETTER (OUTPATIENT)
Age: 28
End: 2023-10-07

## 2023-10-09 PROBLEM — M79.642 PAIN OF LEFT HAND: Status: RESOLVED | Noted: 2023-07-21 | Resolved: 2023-10-09

## 2023-10-09 PROBLEM — M25.532 LEFT WRIST PAIN: Status: RESOLVED | Noted: 2023-07-21 | Resolved: 2023-10-09

## 2024-07-24 ENCOUNTER — TELEPHONE (OUTPATIENT)
Dept: FAMILY MEDICINE | Facility: CLINIC | Age: 29
End: 2024-07-24
Payer: COMMERCIAL

## 2024-07-24 NOTE — LETTER
July 24, 2024      Linette Rosa  [address] [address]                Dear Linette Rosa,    Your clinic record indicates that you are due for Pap and physical exam. Please call the  at [phone] to schedule an appointment.     If you have questions about this letter please contact your provider.    Sincerely,    Your [facility] Team

## 2024-07-24 NOTE — TELEPHONE ENCOUNTER
Patient Quality Outreach    Patient is due for the following:   Cervical Cancer Screening - PAP Needed  Physical Preventive Adult Physical    Next Steps:   Schedule a Adult Preventative    Type of outreach:    Sent letter.      Questions for provider review:    None           Carolin Ren CMA  Chart routed to Care Team.

## 2024-11-13 ENCOUNTER — E-VISIT (OUTPATIENT)
Dept: FAMILY MEDICINE | Facility: CLINIC | Age: 29
End: 2024-11-13
Payer: COMMERCIAL

## 2024-11-13 DIAGNOSIS — N92.6 MISSED PERIOD: Primary | ICD-10-CM

## 2024-11-14 ENCOUNTER — LAB (OUTPATIENT)
Dept: LAB | Facility: CLINIC | Age: 29
End: 2024-11-14
Payer: COMMERCIAL

## 2024-11-14 DIAGNOSIS — N92.6 MISSED PERIOD: ICD-10-CM

## 2024-11-14 LAB — HCG SERPL QL: NEGATIVE

## 2024-11-14 PROCEDURE — 36415 COLL VENOUS BLD VENIPUNCTURE: CPT

## 2024-11-14 PROCEDURE — 84703 CHORIONIC GONADOTROPIN ASSAY: CPT

## 2024-11-14 NOTE — PATIENT INSTRUCTIONS
Thank you for choosing us for your care. I have placed the below lab(s) for you:  -blood pregnancy test       To schedule your lab appointment, please click the Schedule button in your [employer] Home Page.

## 2024-11-30 ENCOUNTER — HEALTH MAINTENANCE LETTER (OUTPATIENT)
Age: 29
End: 2024-11-30

## 2025-02-04 ENCOUNTER — TELEPHONE (OUTPATIENT)
Dept: FAMILY MEDICINE | Facility: CLINIC | Age: 30
End: 2025-02-04
Payer: COMMERCIAL

## 2025-02-04 NOTE — LETTER
February 4, 2025      Linette Rosa  [address] [address]                  Dear Linette Rosa,    Your clinic record indicates that you are due for a Yearly preventative Exam and Cervical Cancer Screening. Please call the  at [phone] to schedule an appointment.     If you have questions about this letter please contact your provider.    Sincerely,    Your [facility] Team

## 2025-02-04 NOTE — TELEPHONE ENCOUNTER
Patient Quality Outreach    Patient is due for the following:   Cervical Cancer Screening - PAP Needed  Physical Preventive Adult Physical    Action(s) Taken:   Schedule a Adult Preventative    Type of outreach:    Sent letter.    Questions for provider review:    None           Carolin Ren CMA  Chart routed to Care Team.

## 2025-03-11 ENCOUNTER — PATIENT OUTREACH (OUTPATIENT)
Dept: FAMILY MEDICINE | Facility: CLINIC | Age: 30
End: 2025-03-11
Payer: COMMERCIAL

## 2025-03-11 NOTE — TELEPHONE ENCOUNTER
Patient Quality Outreach    Patient is due for the following:       Topic Date Due    Pneumococcal Vaccine (1 of 2 - PCV) Never done    Flu Vaccine (1) 09/01/2024    COVID-19 Vaccine (4 - 2024-25 season) 09/01/2024       Action(s) Taken:   Schedule a nurse only visit for immunizations    Type of outreach:    Sent [employer] message.    Questions for provider review:    None           Yoly Bird MA  Chart routed to Care Team.

## (undated) DEVICE — NDL ECLIPSE 18GA 1.5"

## (undated) DEVICE — NDL COUNTER 20CT 31142493

## (undated) DEVICE — SYR 05ML LL W/O NDL

## (undated) DEVICE — GLOVE PROTEXIS W/NEU-THERA 7.5  2D73TE75

## (undated) DEVICE — TUBING IV EXTENSION SET 34"

## (undated) DEVICE — PREP CHLORAPREP 26ML TINTED ORANGE  260815

## (undated) DEVICE — TRAY PROCEDURE SUPPORT PAIN MANAGEMENT 332114